# Patient Record
Sex: MALE | Race: WHITE | Employment: OTHER | URBAN - NONMETROPOLITAN AREA
[De-identification: names, ages, dates, MRNs, and addresses within clinical notes are randomized per-mention and may not be internally consistent; named-entity substitution may affect disease eponyms.]

---

## 2020-09-12 ENCOUNTER — APPOINTMENT (OUTPATIENT)
Dept: GENERAL RADIOLOGY | Age: 55
DRG: 466 | End: 2020-09-12
Attending: EMERGENCY MEDICINE
Payer: MEDICAID

## 2020-09-12 ENCOUNTER — APPOINTMENT (OUTPATIENT)
Dept: CT IMAGING | Age: 55
DRG: 466 | End: 2020-09-12
Attending: EMERGENCY MEDICINE
Payer: MEDICAID

## 2020-09-12 ENCOUNTER — HOSPITAL ENCOUNTER (INPATIENT)
Age: 55
LOS: 6 days | Discharge: NURSING, REHAB OR LONG-TERM CARE | DRG: 466 | End: 2020-09-18
Attending: EMERGENCY MEDICINE | Admitting: INTERNAL MEDICINE
Payer: MEDICAID

## 2020-09-12 DIAGNOSIS — N39.0 URINARY TRACT INFECTION ASSOCIATED WITH INDWELLING URETHRAL CATHETER, SUBSEQUENT ENCOUNTER: Primary | ICD-10-CM

## 2020-09-12 DIAGNOSIS — T83.511D URINARY TRACT INFECTION ASSOCIATED WITH INDWELLING URETHRAL CATHETER, SUBSEQUENT ENCOUNTER: Primary | ICD-10-CM

## 2020-09-12 PROBLEM — G06.1 SPINAL CORD ABSCESS: Status: ACTIVE | Noted: 2020-04-08

## 2020-09-12 PROBLEM — A41.9 SEPSIS (HCC): Status: ACTIVE | Noted: 2020-09-12

## 2020-09-12 PROBLEM — Z72.0 TOBACCO ABUSE: Status: ACTIVE | Noted: 2020-03-17

## 2020-09-12 PROBLEM — F10.20 ALCOHOLISM (HCC): Status: ACTIVE | Noted: 2020-03-17

## 2020-09-12 PROBLEM — F41.9 ANXIETY: Status: ACTIVE | Noted: 2020-07-20

## 2020-09-12 LAB
ANION GAP SERPL CALC-SCNC: 7 MMOL/L (ref 5–15)
APPEARANCE UR: ABNORMAL
APTT PPP: 31.9 SEC (ref 25–37.1)
BACTERIA URNS QL MICRO: ABNORMAL /HPF
BILIRUB UR QL: NEGATIVE
BUN SERPL-MCNC: 30 MG/DL (ref 6–20)
BUN/CREAT SERPL: 27 (ref 12–20)
CA-I BLD-MCNC: 10.8 MG/DL (ref 8.5–10.1)
CHLORIDE SERPL-SCNC: 97 MMOL/L (ref 97–108)
CO2 SERPL-SCNC: 32 MMOL/L (ref 21–32)
COLOR UR: ABNORMAL
CREAT SERPL-MCNC: 1.1 MG/DL (ref 0.7–1.3)
GLUCOSE SERPL-MCNC: 126 MG/DL (ref 65–100)
GLUCOSE UR STRIP.AUTO-MCNC: NEGATIVE MG/DL
HGB UR QL STRIP: ABNORMAL
KETONES UR QL STRIP.AUTO: NEGATIVE MG/DL
LEUKOCYTE ESTERASE UR QL STRIP.AUTO: ABNORMAL
NITRITE UR QL STRIP.AUTO: NEGATIVE
PH UR STRIP: 6 [PH] (ref 5–8)
POTASSIUM SERPL-SCNC: 3.5 MMOL/L (ref 3.5–5.1)
PROT UR STRIP-MCNC: 30 MG/DL
RBC #/AREA URNS HPF: ABNORMAL /HPF (ref 0–3)
SODIUM SERPL-SCNC: 136 MMOL/L (ref 136–145)
SP GR UR REFRACTOMETRY: 1.02 (ref 1–1.03)
THERAPEUTIC RANGE,PTTT: NORMAL SEC (ref 68–109)
UROBILINOGEN UR QL STRIP.AUTO: 0.2 EU/DL (ref 0.2–1)
WBC URNS QL MICRO: >100 /HPF (ref 0–5)

## 2020-09-12 PROCEDURE — 74176 CT ABD & PELVIS W/O CONTRAST: CPT

## 2020-09-12 PROCEDURE — 80048 BASIC METABOLIC PNL TOTAL CA: CPT

## 2020-09-12 PROCEDURE — 74011250637 HC RX REV CODE- 250/637: Performed by: EMERGENCY MEDICINE

## 2020-09-12 PROCEDURE — 74011250636 HC RX REV CODE- 250/636: Performed by: EMERGENCY MEDICINE

## 2020-09-12 PROCEDURE — 99285 EMERGENCY DEPT VISIT HI MDM: CPT

## 2020-09-12 PROCEDURE — 87077 CULTURE AEROBIC IDENTIFY: CPT

## 2020-09-12 PROCEDURE — 85730 THROMBOPLASTIN TIME PARTIAL: CPT

## 2020-09-12 PROCEDURE — 85025 COMPLETE CBC W/AUTO DIFF WBC: CPT

## 2020-09-12 PROCEDURE — 87086 URINE CULTURE/COLONY COUNT: CPT

## 2020-09-12 PROCEDURE — 65660000001 HC RM ICU INTERMED STEPDOWN

## 2020-09-12 PROCEDURE — 65270000029 HC RM PRIVATE

## 2020-09-12 PROCEDURE — 81001 URINALYSIS AUTO W/SCOPE: CPT

## 2020-09-12 PROCEDURE — 87040 BLOOD CULTURE FOR BACTERIA: CPT

## 2020-09-12 PROCEDURE — 71045 X-RAY EXAM CHEST 1 VIEW: CPT

## 2020-09-12 PROCEDURE — 87186 SC STD MICRODIL/AGAR DIL: CPT

## 2020-09-12 RX ORDER — GUAIFENESIN 100 MG/5ML
81 LIQUID (ML) ORAL DAILY
COMMUNITY
Start: 2020-07-28

## 2020-09-12 RX ORDER — LEVOFLOXACIN 5 MG/ML
750 INJECTION, SOLUTION INTRAVENOUS EVERY 24 HOURS
Status: DISCONTINUED | OUTPATIENT
Start: 2020-09-12 | End: 2020-09-18 | Stop reason: HOSPADM

## 2020-09-12 RX ORDER — GABAPENTIN 300 MG/1
300 CAPSULE ORAL 3 TIMES DAILY
COMMUNITY
Start: 2020-07-23

## 2020-09-12 RX ORDER — SODIUM CHLORIDE 9 MG/ML
150 INJECTION, SOLUTION INTRAVENOUS CONTINUOUS
Status: DISCONTINUED | OUTPATIENT
Start: 2020-09-12 | End: 2020-09-18 | Stop reason: HOSPADM

## 2020-09-12 RX ORDER — CYCLOBENZAPRINE HCL 5 MG
5 TABLET ORAL
COMMUNITY
Start: 2020-07-23

## 2020-09-12 RX ORDER — IPRATROPIUM BROMIDE AND ALBUTEROL SULFATE 2.5; .5 MG/3ML; MG/3ML
3 SOLUTION RESPIRATORY (INHALATION)
COMMUNITY
Start: 2020-07-23

## 2020-09-12 RX ORDER — FLUTICASONE PROPIONATE 50 MCG
1 SPRAY, SUSPENSION (ML) NASAL 2 TIMES DAILY
COMMUNITY
Start: 2020-07-23 | End: 2020-09-12

## 2020-09-12 RX ORDER — FAMOTIDINE 20 MG/1
20 TABLET, FILM COATED ORAL 2 TIMES DAILY
COMMUNITY
Start: 2020-07-28

## 2020-09-12 RX ORDER — ACETAMINOPHEN 500 MG
1000 TABLET ORAL
Status: COMPLETED | OUTPATIENT
Start: 2020-09-12 | End: 2020-09-12

## 2020-09-12 RX ORDER — ENOXAPARIN SODIUM 100 MG/ML
40 INJECTION SUBCUTANEOUS
COMMUNITY
Start: 2020-07-23 | End: 2021-04-25

## 2020-09-12 RX ORDER — TAMSULOSIN HYDROCHLORIDE 0.4 MG/1
0.4 CAPSULE ORAL
COMMUNITY
Start: 2020-07-23

## 2020-09-12 RX ORDER — ENOXAPARIN SODIUM 100 MG/ML
40 INJECTION SUBCUTANEOUS EVERY 24 HOURS
Status: CANCELLED | OUTPATIENT
Start: 2020-09-12

## 2020-09-12 RX ORDER — LANOLIN ALCOHOL/MO/W.PET/CERES
200 CREAM (GRAM) TOPICAL DAILY
COMMUNITY
Start: 2020-07-28 | End: 2021-04-25

## 2020-09-12 RX ORDER — CLONAZEPAM 0.5 MG/1
0.25 TABLET ORAL 2 TIMES DAILY
COMMUNITY
Start: 2020-07-23

## 2020-09-12 RX ORDER — ACETAMINOPHEN 325 MG/1
650 TABLET ORAL
COMMUNITY
Start: 2020-07-23

## 2020-09-12 RX ORDER — MELATONIN
500 DAILY
COMMUNITY
Start: 2020-07-24

## 2020-09-12 RX ADMIN — ACETAMINOPHEN 1000 MG: 500 TABLET ORAL at 22:13

## 2020-09-12 RX ADMIN — SODIUM CHLORIDE 150 ML/HR: 9 INJECTION, SOLUTION INTRAVENOUS at 21:37

## 2020-09-13 LAB
ANION GAP SERPL CALC-SCNC: 5 MMOL/L (ref 5–15)
BASOPHILS # BLD: 0 K/UL (ref 0–0.2)
BASOPHILS # BLD: 0 K/UL (ref 0–0.2)
BASOPHILS NFR BLD: 0 % (ref 0–2.5)
BASOPHILS NFR BLD: 0 % (ref 0–2.5)
BUN SERPL-MCNC: 24 MG/DL (ref 6–20)
BUN/CREAT SERPL: 26 (ref 12–20)
CA-I BLD-MCNC: 10.1 MG/DL (ref 8.5–10.1)
CHLORIDE SERPL-SCNC: 100 MMOL/L (ref 97–108)
CO2 SERPL-SCNC: 31 MMOL/L (ref 21–32)
CREAT SERPL-MCNC: 0.92 MG/DL (ref 0.7–1.3)
EOSINOPHIL # BLD: 0.1 K/UL (ref 0–0.7)
EOSINOPHIL # BLD: 0.1 K/UL (ref 0–0.7)
EOSINOPHIL NFR BLD: 0 % (ref 0.9–2.9)
EOSINOPHIL NFR BLD: 0 % (ref 0.9–2.9)
ERYTHROCYTE [DISTWIDTH] IN BLOOD BY AUTOMATED COUNT: 13.7 % (ref 11.5–14.5)
ERYTHROCYTE [DISTWIDTH] IN BLOOD BY AUTOMATED COUNT: 13.9 % (ref 11.5–14.5)
GLUCOSE SERPL-MCNC: 131 MG/DL (ref 65–100)
HCT VFR BLD AUTO: 32.7 % (ref 41–53)
HCT VFR BLD AUTO: 35.9 % (ref 41–53)
HGB BLD-MCNC: 11.2 % (ref 13.5–17.5)
HGB BLD-MCNC: 12.2 % (ref 13.5–17.5)
LACTATE SERPL-SCNC: 0.7 MMOL/L (ref 0.4–2)
LYMPHOCYTES # BLD: 1 K/UL (ref 1–4.8)
LYMPHOCYTES # BLD: 1 K/UL (ref 1–4.8)
LYMPHOCYTES NFR BLD: 5 % (ref 20.5–51.1)
LYMPHOCYTES NFR BLD: 5 % (ref 20.5–51.1)
MAGNESIUM SERPL-MCNC: 1.2 MG/DL (ref 1.6–2.4)
MCH RBC QN AUTO: 28.5 PG (ref 31–34)
MCH RBC QN AUTO: 28.8 PG (ref 31–34)
MCHC RBC AUTO-ENTMCNC: 33.9 G/DL (ref 31–36)
MCHC RBC AUTO-ENTMCNC: 34.2 G/DL (ref 31–36)
MCV RBC AUTO: 84.1 FL (ref 80–100)
MCV RBC AUTO: 84.2 FL (ref 80–100)
MONOCYTES # BLD: 2.1 K/UL (ref 0.2–2.4)
MONOCYTES # BLD: 2.6 K/UL (ref 0.2–2.4)
MONOCYTES NFR BLD: 11 % (ref 1.7–9.3)
MONOCYTES NFR BLD: 13 % (ref 1.7–9.3)
NEUTS SEG # BLD: 15.5 K/UL (ref 1.8–7.7)
NEUTS SEG # BLD: 16.5 K/UL (ref 1.8–7.7)
NEUTS SEG NFR BLD: 82 % (ref 42–75)
NEUTS SEG NFR BLD: 84 % (ref 42–75)
NRBC # BLD: 0.04 K/UL
NRBC # BLD: 0.06 K/UL
NRBC BLD-RTO: 20 PER 100 WBC
NRBC BLD-RTO: 30 PER 100 WBC
PLATELET # BLD AUTO: 339 K/UL
PLATELET # BLD AUTO: 366 K/UL
PMV BLD AUTO: 7.4 FL (ref 6.5–11.5)
PMV BLD AUTO: 7.5 FL (ref 6.5–11.5)
POTASSIUM SERPL-SCNC: 3.7 MMOL/L (ref 3.5–5.1)
RBC # BLD AUTO: 3.88 M/UL (ref 4.5–5.9)
RBC # BLD AUTO: 4.26 M/UL (ref 4.5–5.9)
SODIUM SERPL-SCNC: 136 MMOL/L (ref 136–145)
WBC # BLD AUTO: 18.7 K/UL (ref 4.4–11.3)
WBC # BLD AUTO: 20.2 K/UL (ref 4.4–11.3)

## 2020-09-13 PROCEDURE — 74011250637 HC RX REV CODE- 250/637: Performed by: PHYSICIAN ASSISTANT

## 2020-09-13 PROCEDURE — 74011250636 HC RX REV CODE- 250/636: Performed by: EMERGENCY MEDICINE

## 2020-09-13 PROCEDURE — 74011250637 HC RX REV CODE- 250/637: Performed by: INTERNAL MEDICINE

## 2020-09-13 PROCEDURE — 65660000001 HC RM ICU INTERMED STEPDOWN

## 2020-09-13 PROCEDURE — 85025 COMPLETE CBC W/AUTO DIFF WBC: CPT

## 2020-09-13 PROCEDURE — 83605 ASSAY OF LACTIC ACID: CPT

## 2020-09-13 PROCEDURE — 83735 ASSAY OF MAGNESIUM: CPT

## 2020-09-13 PROCEDURE — 80048 BASIC METABOLIC PNL TOTAL CA: CPT

## 2020-09-13 PROCEDURE — 74011000258 HC RX REV CODE- 258: Performed by: INTERNAL MEDICINE

## 2020-09-13 PROCEDURE — 65270000029 HC RM PRIVATE

## 2020-09-13 PROCEDURE — 94760 N-INVAS EAR/PLS OXIMETRY 1: CPT

## 2020-09-13 PROCEDURE — 36415 COLL VENOUS BLD VENIPUNCTURE: CPT

## 2020-09-13 PROCEDURE — 74011250636 HC RX REV CODE- 250/636: Performed by: PHYSICIAN ASSISTANT

## 2020-09-13 PROCEDURE — 74011000258 HC RX REV CODE- 258: Performed by: EMERGENCY MEDICINE

## 2020-09-13 PROCEDURE — 74011250636 HC RX REV CODE- 250/636: Performed by: INTERNAL MEDICINE

## 2020-09-13 RX ORDER — CLONAZEPAM 1 MG/1
0.25 TABLET ORAL EVERY 12 HOURS
Status: DISCONTINUED | OUTPATIENT
Start: 2020-09-13 | End: 2020-09-14

## 2020-09-13 RX ORDER — MELATONIN
500 DAILY
Status: DISCONTINUED | OUTPATIENT
Start: 2020-09-13 | End: 2020-09-18 | Stop reason: HOSPADM

## 2020-09-13 RX ORDER — IBUPROFEN 600 MG/1
600 TABLET ORAL
Status: DISPENSED | OUTPATIENT
Start: 2020-09-13 | End: 2020-09-16

## 2020-09-13 RX ORDER — POLYETHYLENE GLYCOL 3350 17 G/17G
17 POWDER, FOR SOLUTION ORAL DAILY PRN
Status: DISCONTINUED | OUTPATIENT
Start: 2020-09-13 | End: 2020-09-18 | Stop reason: HOSPADM

## 2020-09-13 RX ORDER — IPRATROPIUM BROMIDE AND ALBUTEROL SULFATE 2.5; .5 MG/3ML; MG/3ML
3 SOLUTION RESPIRATORY (INHALATION)
Status: DISCONTINUED | OUTPATIENT
Start: 2020-09-13 | End: 2020-09-15

## 2020-09-13 RX ORDER — GABAPENTIN 300 MG/1
300 CAPSULE ORAL EVERY 8 HOURS
Status: DISCONTINUED | OUTPATIENT
Start: 2020-09-13 | End: 2020-09-18 | Stop reason: HOSPADM

## 2020-09-13 RX ORDER — TAMSULOSIN HYDROCHLORIDE 0.4 MG/1
0.4 CAPSULE ORAL DAILY
Status: DISCONTINUED | OUTPATIENT
Start: 2020-09-13 | End: 2020-09-18 | Stop reason: HOSPADM

## 2020-09-13 RX ORDER — SODIUM CHLORIDE 0.9 % (FLUSH) 0.9 %
5-40 SYRINGE (ML) INJECTION EVERY 8 HOURS
Status: DISCONTINUED | OUTPATIENT
Start: 2020-09-13 | End: 2020-09-18 | Stop reason: HOSPADM

## 2020-09-13 RX ORDER — CYCLOBENZAPRINE HCL 10 MG
5 TABLET ORAL
Status: DISCONTINUED | OUTPATIENT
Start: 2020-09-13 | End: 2020-09-18 | Stop reason: HOSPADM

## 2020-09-13 RX ORDER — ENOXAPARIN SODIUM 100 MG/ML
40 INJECTION SUBCUTANEOUS DAILY
Status: DISCONTINUED | OUTPATIENT
Start: 2020-09-13 | End: 2020-09-18 | Stop reason: HOSPADM

## 2020-09-13 RX ORDER — FAMOTIDINE 20 MG/1
20 TABLET, FILM COATED ORAL EVERY 12 HOURS
Status: DISCONTINUED | OUTPATIENT
Start: 2020-09-13 | End: 2020-09-18 | Stop reason: HOSPADM

## 2020-09-13 RX ORDER — ACETAMINOPHEN 325 MG/1
650 TABLET ORAL
Status: DISCONTINUED | OUTPATIENT
Start: 2020-09-13 | End: 2020-09-13

## 2020-09-13 RX ORDER — BISMUTH SUBSALICYLATE 262 MG
1 TABLET,CHEWABLE ORAL DAILY
Status: DISCONTINUED | OUTPATIENT
Start: 2020-09-13 | End: 2020-09-18 | Stop reason: HOSPADM

## 2020-09-13 RX ORDER — ASPIRIN 325 MG/1
100 TABLET, FILM COATED ORAL DAILY
Status: DISCONTINUED | OUTPATIENT
Start: 2020-09-13 | End: 2020-09-18 | Stop reason: HOSPADM

## 2020-09-13 RX ORDER — PROMETHAZINE HYDROCHLORIDE 25 MG/1
12.5 TABLET ORAL
Status: DISCONTINUED | OUTPATIENT
Start: 2020-09-13 | End: 2020-09-18 | Stop reason: HOSPADM

## 2020-09-13 RX ORDER — OXYCODONE AND ACETAMINOPHEN 5; 325 MG/1; MG/1
1 TABLET ORAL
Status: DISCONTINUED | OUTPATIENT
Start: 2020-09-13 | End: 2020-09-15

## 2020-09-13 RX ORDER — ONDANSETRON 2 MG/ML
4 INJECTION INTRAMUSCULAR; INTRAVENOUS
Status: DISCONTINUED | OUTPATIENT
Start: 2020-09-13 | End: 2020-09-18 | Stop reason: HOSPADM

## 2020-09-13 RX ORDER — SODIUM CHLORIDE 0.9 % (FLUSH) 0.9 %
5-40 SYRINGE (ML) INJECTION AS NEEDED
Status: DISCONTINUED | OUTPATIENT
Start: 2020-09-13 | End: 2020-09-18 | Stop reason: HOSPADM

## 2020-09-13 RX ORDER — GUAIFENESIN 100 MG/5ML
81 LIQUID (ML) ORAL DAILY
Status: DISCONTINUED | OUTPATIENT
Start: 2020-09-13 | End: 2020-09-18 | Stop reason: HOSPADM

## 2020-09-13 RX ADMIN — CEFEPIME 2 G: 2 INJECTION, POWDER, FOR SOLUTION INTRAVENOUS at 17:27

## 2020-09-13 RX ADMIN — CEFEPIME 2 G: 2 INJECTION, POWDER, FOR SOLUTION INTRAVENOUS at 01:15

## 2020-09-13 RX ADMIN — OXYCODONE HYDROCHLORIDE AND ACETAMINOPHEN 1 TABLET: 5; 325 TABLET ORAL at 10:20

## 2020-09-13 RX ADMIN — SODIUM CHLORIDE 150 ML/HR: 9 INJECTION, SOLUTION INTRAVENOUS at 04:40

## 2020-09-13 RX ADMIN — SODIUM CHLORIDE 150 ML/HR: 9 INJECTION, SOLUTION INTRAVENOUS at 10:40

## 2020-09-13 RX ADMIN — TAMSULOSIN HYDROCHLORIDE 0.4 MG: 0.4 CAPSULE ORAL at 10:18

## 2020-09-13 RX ADMIN — Medication 10 ML: at 10:27

## 2020-09-13 RX ADMIN — CLONAZEPAM 0.25 MG: 1 TABLET ORAL at 10:26

## 2020-09-13 RX ADMIN — GABAPENTIN 300 MG: 300 CAPSULE ORAL at 10:18

## 2020-09-13 RX ADMIN — LEVOFLOXACIN 750 MG: 5 INJECTION, SOLUTION INTRAVENOUS at 22:39

## 2020-09-13 RX ADMIN — CLONAZEPAM 0.25 MG: 1 TABLET ORAL at 20:44

## 2020-09-13 RX ADMIN — GABAPENTIN 300 MG: 300 CAPSULE ORAL at 14:00

## 2020-09-13 RX ADMIN — IBUPROFEN 600 MG: 600 TABLET, FILM COATED ORAL at 10:16

## 2020-09-13 RX ADMIN — SODIUM CHLORIDE 150 ML/HR: 9 INJECTION, SOLUTION INTRAVENOUS at 17:26

## 2020-09-13 RX ADMIN — FAMOTIDINE 20 MG: 20 TABLET, FILM COATED ORAL at 20:44

## 2020-09-13 RX ADMIN — Medication 10 ML: at 14:00

## 2020-09-13 RX ADMIN — LEVOFLOXACIN 750 MG: 5 INJECTION, SOLUTION INTRAVENOUS at 01:14

## 2020-09-13 RX ADMIN — FAMOTIDINE 20 MG: 20 TABLET, FILM COATED ORAL at 10:17

## 2020-09-13 RX ADMIN — VITAMIN D, TAB 1000IU (100/BT) 0.5 TABLET: 25 TAB at 10:20

## 2020-09-13 RX ADMIN — MULTIVITAMIN TABLET 1 TABLET: TABLET at 10:17

## 2020-09-13 RX ADMIN — GABAPENTIN 300 MG: 300 CAPSULE ORAL at 22:39

## 2020-09-13 RX ADMIN — CEFEPIME 2 G: 2 INJECTION, POWDER, FOR SOLUTION INTRAVENOUS at 10:18

## 2020-09-13 RX ADMIN — ENOXAPARIN SODIUM 40 MG: 40 INJECTION SUBCUTANEOUS at 10:19

## 2020-09-13 RX ADMIN — THIAMINE HCL TAB 100 MG 100 MG: 100 TAB at 10:17

## 2020-09-13 RX ADMIN — ASPIRIN 81 MG 81 MG: 81 TABLET ORAL at 10:18

## 2020-09-13 NOTE — ROUTINE PROCESS
Bedside shift change report given to [address] (oncoming nurse) by Dee Dahl nurse).  Report included the following information SBAR

## 2020-09-13 NOTE — ED PROVIDER NOTES
HPI   Patient is a 48 y.o.  male [facility] who presents to the ER with a chief complain   Chief Complaint   Patient presents with    Fever    Generalized Body Aches        Past Medical History:   Diagnosis Date    History of spinal surgery     Urinary tract infection     frequent uti       History reviewed. No pertinent surgical history. History reviewed. No pertinent family history. Social History     Socioeconomic History    Marital status: SINGLE     Spouse name: Not on file    Number of children: Not on file    Years of education: Not on file    Highest education level: Not on file   Occupational History    Not on file   Social Needs    Financial resource strain: Not on file    Food insecurity     Worry: Not on file     Inability: Not on file    Transportation needs     Medical: Not on file     Non-medical: Not on file   Tobacco Use    Smoking status: Current Every Day Smoker     Types: Cigarettes   Substance and Sexual Activity    Alcohol use: Not on file    Drug use: Not on file    Sexual activity: Not on file   Lifestyle    Physical activity     Days per week: 0 days     Minutes per session: 0 min    Stress: Very much   Relationships    Social connections     Talks on phone: Not on file     Gets together: Not on file     Attends [employer] service: Not on file     Active member of club or organization: Not on file     Attends meetings of clubs or organizations: Not on file     Relationship status: Not on file    Intimate partner violence     Fear of current or ex partner: Not on file     Emotionally abused: Not on file     Physically abused: Not on file     Forced sexual activity: Not on file   Other Topics Concern    Not on file   Social History Narrative    Not on file         ALLERGIES: Patient has no known allergies. Review of Systems   Constitutional: Negative. HENT: Negative. Eyes: Negative. Respiratory: Negative. Cardiovascular: Negative. Gastrointestinal: Negative. Endocrine: Negative. Genitourinary: Negative. Musculoskeletal: Negative. Allergic/Immunologic: Negative. Neurological: Negative. Hematological: Negative. Psychiatric/Behavioral: Negative. Vitals:    09/12/20 1520   BP: 95/65   Pulse: 100   Resp: 18   Temp: 99 °F (37.2 °C)   SpO2: 94%   Weight: 56.2 kg (124 lb)   Height: 6' (1.829 m)            Physical Exam  Vitals signs and nursing note reviewed. Constitutional:       Appearance: Normal appearance. He is normal weight. HENT:      Head: Normocephalic. Right Ear: Tympanic membrane normal.      Left Ear: Tympanic membrane normal.      Nose: Nose normal.      Mouth/Throat:      Mouth: Mucous membranes are moist.   Eyes:      Extraocular Movements: Extraocular movements intact. Pupils: Pupils are equal, round, and reactive to light. Neck:      Musculoskeletal: Normal range of motion and neck supple. Cardiovascular:      Rate and Rhythm: Normal rate and regular rhythm. Pulses: Normal pulses. Heart sounds: Normal heart sounds. Pulmonary:      Effort: Pulmonary effort is normal.      Breath sounds: Normal breath sounds. Abdominal:      General: Abdomen is flat. Bowel sounds are normal.      Palpations: Abdomen is soft. Genitourinary:     Comments: Bladder Urinary Bladder Outlet syndrome  Musculoskeletal: Normal range of motion. Neurological:      Mental Status: He is alert and oriented to person, place, and time. Sensory: Sensory deficit present. Motor: Weakness present.       Comments: Bilateral Lower extremity paralysis          MDM  Number of Diagnoses or Management Options     Amount and/or Complexity of Data Reviewed  Clinical lab tests: reviewed      ED Course as of Sep 12 2120   Sat Sep 12, 2020   1943 URINALYSIS W/ RFLX MICROSCOPIC(!):    Color Yellow/Straw   Appearance Cloudy(!)   Specific gravity 1.025   pH (UA) 6.0   Protein 30(!)   Glucose Negative Ketone Negative   Bilirubin Negative   Blood Small(!)   Urobilinogen 0.2   Nitrites Negative   Leukocyte Esterase Moderate(!) [MM]   1943 URINE MICROSCOPIC(!):    WBC >100(!)   RBC 0-5   Bacteria 4+(!) [MM]   1944 URINE MICROSCOPIC(!):    WBC >100(!)   RBC 0-5   Bacteria 4+(!) [MM]      ED Course User Index  [MM] Estella Holter, MD       Procedures

## 2020-09-13 NOTE — ASSESSMENT & PLAN NOTE
Pt has had temp up to 103 and is being treated with ibuprofen.   Continue antibiotics continue antipyretics

## 2020-09-13 NOTE — ROUTINE PROCESS
TRANSFER - IN REPORT:    Verbal report received from Dewey Martinez (name) on Carlos Mosley  being received from [facility], LPN(unit) for routine progression of care      Report consisted of patients Situation, Background, Assessment and   Recommendations(SBAR). Information from the following report(s) SBAR was reviewed with the receiving nurse. Opportunity for questions and clarification was provided. Assessment completed upon patients arrival to unit and care assumed.

## 2020-09-13 NOTE — ASSESSMENT & PLAN NOTE
-Leukocytosis with likely primary source from Ferrell catheter  -No fever but otherwise meets criteria for sepsis  -IV fluids 0.9% normal saline 125 an hour  -Initiate Levaquin 750 mg IV piggyback daily  -Urine and blood cultures pending

## 2020-09-13 NOTE — H&P
History and Physical    Subjective:     Carlos Mosley is a 48 y.o. male  has a past medical history of History of spinal surgery tobacco abuse, spinal cord abscess, alcoholism, anxiety, IV drug abuse and paraplegia presents emergency department with diffuse pain to left hip arms and left side along midaxillary line. Patient reports that he has been feeling just poorly in the preceding 2 weeks prior to his arrival in the emergency department. He does have a indwelling Ferrell catheter as this is required secondary to the paraplegia. 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 He is presently a resident in a rehab/skilled nursing facility and had been making progress where he was able to feed himself until about 2 weeks ago where he felt weak and reduced arm strength. The patient was evaluated in the emergency department and found to have cloudy urine with small blood and moderate leukoesterase and negative nitrites and +4 bacteria    Normal sodium and potassium however patient does have a mildly elevated glucose with elevated BUN while creatinine is normal.  Calcium is also mildly elevated which we will follow closely. Portable chest shows no acute findings. As noted leukocytosis present with WBCs at 20.2 decreased H&H at 12.2/35.9, respectively. Platelets are normal at 366    Due to prior history of spinal abscess abdominal pelvic CT without contrast has been ordered pending results. The patient will be admitted to the acute care service expect length of stay of at least 2-3 midnight on telemetry. Past Medical History:   Diagnosis Date    History of spinal surgery     Urinary tract infection     frequent uti      Past Surgical History:   Procedure Laterality Date    HX ORTHOPAEDIC       History reviewed. No pertinent family history.    Social History     Tobacco Use    Smoking status: Current Every Day Smoker     Types: Cigarettes Substance Use Topics    Alcohol use: Not on file       Prior to Admission medications    Medication Sig Start Date End Date Taking? Authorizing Provider   acetaminophen (TYLENOL) 325 mg tablet Take 650 mg by mouth every four (4) hours as needed. 7/23/20  Yes Provider, Historical   aspirin 81 mg chewable tablet Take 81 mg by mouth daily. 7/28/20  Yes Provider, Historical   cholecalciferol (VITAMIN D3) (1000 Units /25 mcg) tablet Take 500 Units by mouth daily. 7/24/20  Yes Provider, Historical   clonazePAM (KlonoPIN) 0.5 mg tablet Take 0.25 mg by mouth two (2) times a day. 7/23/20  Yes Provider, Historical   cyclobenzaprine (FLEXERIL) 5 mg tablet Take 5 mg by mouth every eight (8) hours as needed. 7/23/20  Yes Provider, Historical   enoxaparin (LOVENOX) 40 mg/0.4 mL 40 mg by SubCUTAneous route. 7/23/20  Yes Provider, Historical   famotidine (PEPCID) 20 mg tablet Take 20 mg by mouth two (2) times a day. 7/28/20  Yes Provider, Historical   gabapentin (NEURONTIN) 300 mg capsule Take 300 mg by mouth three (3) times daily. 7/23/20  Yes Provider, Historical   albuterol-ipratropium (DUO-NEB) 2.5 mg-0.5 mg/3 ml nebu Take 3 mL by inhalation every six (6) hours as needed. 7/23/20  Yes Provider, Historical   thiamine HCL (B-1) 100 mg tablet Take 200 mg by mouth daily. 7/28/20  Yes Provider, Historical   tamsulosin (FLOMAX) 0.4 mg capsule Take 0.4 mg by mouth. 7/23/20  Yes Provider, Historical   multivit-min-iron-folic-vit K1 8 TS-024 mcg- 10 mcg chew Take 1 Tab by mouth daily. 7/28/20  Yes Provider, Historical     No Known Allergies     Review of Systems:  Review of Systems   Constitutional: Positive for activity change, appetite change and fatigue. Negative for chills, diaphoresis, fever and unexpected weight change. HENT: Negative for congestion, dental problem, ear discharge, ear pain, mouth sores, nosebleeds, sore throat and tinnitus. Eyes: Negative.     Respiratory: Negative for apnea, cough, choking, chest tightness, shortness of breath and wheezing. Gastrointestinal: Positive for constipation. Negative for abdominal distention, abdominal pain, anal bleeding, diarrhea and vomiting. Endocrine: Negative. Genitourinary: Positive for difficulty urinating, dysuria, flank pain and hematuria. Negative for enuresis. Musculoskeletal: Positive for back pain and myalgias. Skin: Negative. Allergic/Immunologic: Negative. Neurological: Positive for weakness. Negative for dizziness, tremors, seizures, syncope, facial asymmetry and light-headedness. Hematological: Negative for adenopathy. Does not bruise/bleed easily. Psychiatric/Behavioral: Positive for agitation and confusion. The patient is nervous/anxious. All other systems reviewed and are negative. Objective:     Vitals:  Visit Vitals  BP 95/65 (BP Patient Position: At rest)   Pulse 100   Temp 99 °F (37.2 °C)   Resp 18   Ht 6' (1.829 m)   Wt 56.2 kg (124 lb)   SpO2 94%   BMI 16.82 kg/m²       Physical Exam:  General: Alert, cooperative, no distress. Head:  Normocephalic, without obvious abnormality, atraumatic. Eyes:  Conjunctivae/corneas clear. Pupils equal, round, reactive to light. Extraocular movements intact. Lungs:  Clear to auscultation bilaterally, no wheezes, crackles  Chest wall: No tenderness or deformity. Heart:  Regular rate and rhythm, S1, S2 normal, no murmur, click, rub, or gallop. Abdomen:   Soft, non-tender. Bowel sounds normal. No masses. No organomegaly. Back:  No spine tenderness to palpation  Extremities: Extremities normal, atraumatic, no cyanosis or edema. Pulses: Symmetric all extremities.   Skin: Skin color, texture, turgor normal.   Lymph nodes: Cervical nodes normal.  Neurologic: Awake and oriented,       Labs:  Recent Results (from the past 24 hour(s))   URINALYSIS W/ RFLX MICROSCOPIC    Collection Time: 09/12/20  4:45 PM   Result Value Ref Range    Color Yellow/Straw      Appearance Cloudy (A) Clear Specific gravity 1.025 1.003 - 1.030      pH (UA) 6.0 5.0 - 8.0      Protein 30 (A) Negative mg/dL    Glucose Negative Negative mg/dL    Ketone Negative Negative mg/dL    Bilirubin Negative Negative      Blood Small (A) Negative      Urobilinogen 0.2 0.2 - 1.0 EU/dL    Nitrites Negative Negative      Leukocyte Esterase Moderate (A) Negative     CBC WITH AUTOMATED DIFF    Collection Time: 09/12/20  4:45 PM   Result Value Ref Range    WBC 20.2 (H) 4.4 - 11.3 K/uL    RBC 4.26 (L) 4.50 - 5.90 M/uL    HGB 12.2 (L) 13.5 - 17.5 %    HCT 35.9 (L) 41 - 53 %    MCV 84.2 80 - 100 FL    MCH 28.5 (L) 31 - 34 PG    MCHC 33.9 31.0 - 36.0 g/dL    RDW 13.9 11.5 - 14.5 %    PLATELET 505 K/uL    MPV 7.5 6.5 - 11.5 FL    NRBC 20.0  WBC    ABSOLUTE NRBC 0.04 K/uL    ABS. MONOCYTES PENDING K/UL    NEUTROPHILS 82 (H) 42 - 75 %    LYMPHOCYTES 5 (L) 20.5 - 51.1 %    MONOCYTES 13 (H) 1.7 - 9.3 %    EOSINOPHILS 0 (L) 0.9 - 2.9 %    BASOPHILS 0 0.0 - 2.5 %    ABS. NEUTROPHILS 16.5 (H) 1.8 - 7.7 K/UL    ABS. LYMPHOCYTES 1.0 1.0 - 4.8 K/UL    ABS. EOSINOPHILS 0.1 0.0 - 0.7 K/UL    ABS.  BASOPHILS 0.0 0.0 - 0.2 K/UL   METABOLIC PANEL, BASIC    Collection Time: 09/12/20  4:45 PM   Result Value Ref Range    Sodium 136 136 - 145 mmol/L    Potassium 3.5 3.5 - 5.1 mmol/L    Chloride 97 97 - 108 mmol/L    CO2 32 21 - 32 mmol/L    Anion gap 7 5 - 15 mmol/L    Glucose 126 (H) 65 - 100 mg/dL    BUN 30 (H) 6 - 20 mg/dL    Creatinine 1.10 0.70 - 1.30 mg/dL    BUN/Creatinine ratio 27 (H) 12 - 20      GFR est AA >60 >60 ml/min/1.73m2    GFR est non-AA >60 >60 ml/min/1.73m2    Calcium 10.8 (H) 8.5 - 10.1 mg/dL   PTT    Collection Time: 09/12/20  4:45 PM   Result Value Ref Range    aPTT 31.9 25.0 - 37.1 sec    aPTT, therapeutic range   68 - 109 sec   URINE MICROSCOPIC    Collection Time: 09/12/20  4:45 PM   Result Value Ref Range    WBC >100 (H) 0 - 5 /hpf    RBC 0-5 0 - 3 /hpf    Bacteria 4+ (A) Negative /hpf       Imaging:  Xr Chest Port    Result Date: 9/12/2020  Single frontal view of the chest. No prior films for comparison. Heart size is normal. Lungs are clear of infiltrate. No pulmonary nodule or pleural effusion is seen. There is minimal deformity of several left ribs consistent with old healed fractures. No acute bony abnormalities are identified. Impression: No acute abnormality demonstrated. Assessment & Plan:     Sepsis ([facility] [address]  -Leukocytosis with likely primary source from Ferrell catheter  -No fever but otherwise meets criteria for sepsis  -IV fluids 0.9% normal saline 125 an hour  -Initiate Levaquin 750 mg IV piggyback daily  -Urine and blood cultures pending    UTI (urinary tract infection)  -Leukocytosis with likely primary source from Ferrell catheter  -No fever but otherwise meets criteria for sepsis  -IV fluids 0.9% normal saline 125 an hour  -Initiate Levaquin 750 mg IV piggyback daily  -Urine and blood cultures pending  -Urine is cloudy, with small blood and moderate leukoesterase and negative nitrites and +4 bacteria    Normal sodium and potassium however patient does have a mildly elevated glucose with elevated BUN while creatinine is normal.  Calcium is also mildly elevated which we will follow closely. Portable chest shows no acute findings. As noted leukocytosis present with WBCs at 20.2 decreased H&H at 12.2/35.9, respectively.   Platelets are normal at 366      Anxiety  -Continue with clonazepam as needed  -Otherwise stable    Tobacco abuse  -Nicotine patch has been offered  -Patient has refused    Spinal cord abscess  -Suspicion about whether related to previous history of IV drug abuse  -Patient remains afebrile however leukocytosis suspect associated with UTI/urosepsis  -Abdomen pelvic CT obtained to evaluate possible spinal cord acute changes    Alcoholism ([facility] [address]  -CIWA less than 6  -Low risk for withdrawal     CODE Status: Full    VTE: Lovenox 40 mg subcu daily and SCDs    SIGNED:  Roland Paul, PhD, PA-C, [facility]

## 2020-09-13 NOTE — ASSESSMENT & PLAN NOTE
-Leukocytosis with likely primary source from Ferrell catheter  -No fever but otherwise meets criteria for sepsis  -IV fluids 0.9% normal saline 125 an hour  -Initiate Levaquin 750 mg IV piggyback daily  -Urine and blood cultures pending  -Urine is cloudy, with small blood and moderate leukoesterase and negative nitrites and +4 bacteria    Normal sodium and potassium however patient does have a mildly elevated glucose with elevated BUN while creatinine is normal.  Calcium is also mildly elevated which we will follow closely. Portable chest shows no acute findings. As noted leukocytosis present with WBCs at 20.2 decreased H&H at 12.2/35.9, respectively.   Platelets are normal at 366

## 2020-09-13 NOTE — PROGRESS NOTES
Progress Note  Date:9/13/2020       Room:201/01  Patient Maxwell Adam     YOB: 1965     Age:55 y.o. Subjective    Jana Murrieta is a 48 y.o. male  has a past medical history of History of spinal surgery tobacco abuse, spinal cord abscess, alcoholism, anxiety, IV drug abuse and paraplegia presents emergency department with diffuse pain to left hip arms and left side along midaxillary line. Patient reports that he has been feeling just poorly in the preceding 2 weeks prior to his arrival in the emergency department. He does have a indwelling Ferrell catheter as this is required secondary to the paraplegia. 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 He is presently a resident in a rehab/skilled nursing facility and had been making progress where he was able to feed himself until about 2 weeks ago where he felt weak and reduced arm strength. Patient is seen Gracesadie Go in bed this morning with complaints of generalized numbness and pain and is requesting the oxycodone that he normally takes twice a day at home. Review of Systems   Constitutional: Positive for chills. Neurological: Positive for weakness and numbness. Objective           Vitals Last 24 Hours:  Patient Vitals for the past 24 hrs:   Temp Pulse Resp BP SpO2   09/13/20 0800     94 %   09/13/20 0755 (!) 101.8 °F (38.8 °C) (!) 116 18 107/67 98 %   09/13/20 0456 97 °F (36.1 °C) 86 18 119/72 100 %   09/13/20 0040 100.3 °F (37.9 °C) 96 17 (!) 100/53 96 %   09/12/20 2200  94 18 129/72 91 %   09/12/20 2130  (!) 121 18 120/73 90 %   09/12/20 2100 (!) 103.2 °F (39.6 °C)       09/12/20 2030  83 16 (!) 154/87    09/12/20 1930  83  (!) 154/87    09/12/20 1520 99 °F (37.2 °C) 100 18 95/65 94 %        I/O (24Hr):     Intake/Output Summary (Last 24 hours) at 9/13/2020 0947  Last data filed at 9/13/2020 0731  Gross per 24 hour   Intake 1476 ml   Output 1000 ml   Net 476 ml       Physical Exam:  Gen:  AAO X 4, NAD  HEENT   Eyes: extraocular muscles intact  Heart:  RRR  Lungs: CTA, neg wheezes/crackles  Abd:  Soft, nontender, nondistended, positive BS  Ext: no cyanosis, clubbing or edema  Neuro: awake and alert, answering questions      Medications           Current Facility-Administered Medications   Medication Dose Route Frequency    albuterol-ipratropium (DUO-NEB) 2.5 MG-0.5 MG/3 ML  3 mL Nebulization Q6H PRN    aspirin chewable tablet 81 mg  81 mg Oral DAILY    cholecalciferol (VITAMIN D3) (1000 Units /25 mcg) tablet 0.5 Tab  500 Units Oral DAILY    clonazePAM (KlonoPIN) tablet 0.25 mg  0.25 mg Oral BID    cyclobenzaprine (FLEXERIL) tablet 5 mg  5 mg Oral TID PRN    famotidine (PEPCID) tablet 20 mg  20 mg Oral BID    gabapentin (NEURONTIN) capsule 300 mg  300 mg Oral TID    . PHARMACY TO SUBSTITUTE PER PROTOCOL (Reordered from: multivit-min-iron-folic-vit K1 8 LR-072 mcg- 10 mcg chew)    Per Protocol    tamsulosin (FLOMAX) capsule 0.4 mg  0.4 mg Oral DAILY    . PHARMACY TO SUBSTITUTE PER PROTOCOL (Reordered from: thiamine HCL (B-1) 100 mg tablet)    Per Protocol    sodium chloride (NS) flush 5-40 mL  5-40 mL IntraVENous Q8H    sodium chloride (NS) flush 5-40 mL  5-40 mL IntraVENous PRN    polyethylene glycol (MIRALAX) packet 17 g  17 g Oral DAILY PRN    promethazine (PHENERGAN) tablet 12.5 mg  12.5 mg Oral Q6H PRN    Or    ondansetron (ZOFRAN) injection 4 mg  4 mg IntraVENous Q6H PRN    enoxaparin (LOVENOX) injection 40 mg  40 mg SubCUTAneous DAILY    oxyCODONE-acetaminophen (PERCOCET) 5-325 mg per tablet 1 Tab  1 Tab Oral BID PRN    ibuprofen (MOTRIN) tablet 600 mg  600 mg Oral Q8H PRN    0.9% sodium chloride infusion  150 mL/hr IntraVENous CONTINUOUS    cefepime (MAXIPIME) 2 g in 0.9% sodium chloride (MBP/ADV) 100 mL MBP  2 g IntraVENous Q8H    levoFLOXacin (LEVAQUIN) 750 mg in D5W IVPB  750 mg IntraVENous Q24H         Allergies         Patient has no known allergies. Labs/Imaging/Diagnostics      Labs:  Recent Results (from the past 24 hour(s))   URINALYSIS W/ RFLX MICROSCOPIC    Collection Time: 09/12/20  4:45 PM   Result Value Ref Range    Color Yellow/Straw      Appearance Cloudy (A) Clear      Specific gravity 1.025 1.003 - 1.030      pH (UA) 6.0 5.0 - 8.0      Protein 30 (A) Negative mg/dL    Glucose Negative Negative mg/dL    Ketone Negative Negative mg/dL    Bilirubin Negative Negative      Blood Small (A) Negative      Urobilinogen 0.2 0.2 - 1.0 EU/dL    Nitrites Negative Negative      Leukocyte Esterase Moderate (A) Negative     CBC WITH AUTOMATED DIFF    Collection Time: 09/12/20  4:45 PM   Result Value Ref Range    WBC 20.2 (H) 4.4 - 11.3 K/uL    RBC 4.26 (L) 4.50 - 5.90 M/uL    HGB 12.2 (L) 13.5 - 17.5 %    HCT 35.9 (L) 41 - 53 %    MCV 84.2 80 - 100 FL    MCH 28.5 (L) 31 - 34 PG    MCHC 33.9 31.0 - 36.0 g/dL    RDW 13.9 11.5 - 14.5 %    PLATELET 670 K/uL    MPV 7.5 6.5 - 11.5 FL    NRBC 20.0  WBC    ABSOLUTE NRBC 0.04 K/uL    ABS. MONOCYTES 2.6 (H) 0.2 - 2.4 K/UL    NEUTROPHILS 82 (H) 42 - 75 %    LYMPHOCYTES 5 (L) 20.5 - 51.1 %    MONOCYTES 13 (H) 1.7 - 9.3 %    EOSINOPHILS 0 (L) 0.9 - 2.9 %    BASOPHILS 0 0.0 - 2.5 %    ABS. NEUTROPHILS 16.5 (H) 1.8 - 7.7 K/UL    ABS. LYMPHOCYTES 1.0 1.0 - 4.8 K/UL    ABS. EOSINOPHILS 0.1 0.0 - 0.7 K/UL    ABS.  BASOPHILS 0.0 0.0 - 0.2 K/UL   METABOLIC PANEL, BASIC    Collection Time: 09/12/20  4:45 PM   Result Value Ref Range    Sodium 136 136 - 145 mmol/L    Potassium 3.5 3.5 - 5.1 mmol/L    Chloride 97 97 - 108 mmol/L    CO2 32 21 - 32 mmol/L    Anion gap 7 5 - 15 mmol/L    Glucose 126 (H) 65 - 100 mg/dL    BUN 30 (H) 6 - 20 mg/dL    Creatinine 1.10 0.70 - 1.30 mg/dL    BUN/Creatinine ratio 27 (H) 12 - 20      GFR est AA >60 >60 ml/min/1.73m2    GFR est non-AA >60 >60 ml/min/1.73m2    Calcium 10.8 (H) 8.5 - 10.1 mg/dL   PTT    Collection Time: 09/12/20  4:45 PM   Result Value Ref Range    aPTT 31.9 25.0 - 37.1 sec    aPTT, therapeutic range   68 - 109 sec   URINE MICROSCOPIC    Collection Time: 09/12/20  4:45 PM   Result Value Ref Range    WBC >100 (H) 0 - 5 /hpf    RBC 0-5 0 - 3 /hpf    Bacteria 4+ (A) Negative /hpf   CBC WITH AUTOMATED DIFF    Collection Time: 09/13/20  8:03 AM   Result Value Ref Range    WBC 18.7 (H) 4.4 - 11.3 K/uL    RBC 3.88 (L) 4.50 - 5.90 M/uL    HGB 11.2 (L) 13.5 - 17.5 %    HCT 32.7 (L) 41 - 53 %    MCV 84.1 80 - 100 FL    MCH 28.8 (L) 31 - 34 PG    MCHC 34.2 31.0 - 36.0 g/dL    RDW 13.7 11.5 - 14.5 %    PLATELET 786 K/uL    MPV 7.4 6.5 - 11.5 FL    NRBC 30.0  WBC    ABSOLUTE NRBC 0.06 K/uL    ABS. MONOCYTES PENDING K/UL    NEUTROPHILS 84 (H) 42 - 75 %    LYMPHOCYTES 5 (L) 20.5 - 51.1 %    MONOCYTES 11 (H) 1.7 - 9.3 %    EOSINOPHILS 0 (L) 0.9 - 2.9 %    BASOPHILS 0 0.0 - 2.5 %    ABS. NEUTROPHILS 15.5 (H) 1.8 - 7.7 K/UL    ABS. LYMPHOCYTES 1.0 1.0 - 4.8 K/UL    ABS. EOSINOPHILS 0.1 0.0 - 0.7 K/UL    ABS. BASOPHILS 0.0 0.0 - 0.2 K/UL   METABOLIC PANEL, BASIC    Collection Time: 09/13/20  8:03 AM   Result Value Ref Range    Sodium 136 136 - 145 mmol/L    Potassium 3.7 3.5 - 5.1 mmol/L    Chloride 100 97 - 108 mmol/L    CO2 31 21 - 32 mmol/L    Anion gap 5 5 - 15 mmol/L    Glucose 131 (H) 65 - 100 mg/dL    BUN 24 (H) 6 - 20 mg/dL    Creatinine 0.92 0.70 - 1.30 mg/dL    BUN/Creatinine ratio 26 (H) 12 - 20      GFR est AA >60 >60 ml/min/1.73m2    GFR est non-AA >60 >60 ml/min/1.73m2    Calcium 10.1 8.5 - 10.1 mg/dL        Imaging Last 24 Hours:  Ct Abd Pelv Wo Cont    Result Date: 9/12/2020  Fever. Sepsis. TECHNIQUE: Axial imaging abdomen and pelvis without IV or oral contrast. Dose reduction: All CT scans at this facility are performed using dose reduction optimization techniques as appropriate to a performed exam including the following: Automated exposure control, adjustments of the mA and/or kV according to patient's size, or use of iterative reconstruction technique.  FINDINGS: Small left greater than right pleural effusions and adjacent airspace disease. Gallbladder absent. Liver, spleen, pancreas, adrenal glands unremarkable for noncontrast technique. Punctate nonobstructing bilateral renal calcifications. No hydronephrosis or hydroureter. The bladder contains a Ferrell catheter, air urine level, and large calcifications. The bladder wall is circumferentially thickened. Mild prostate enlargement. Calcified abdominal aorta and branch vessels. No abdominal aortic aneurysm. Moderate volume colonic stool. Colonic diverticulosis. No diverticulitis or bowel dilation. The appendix is not separately identified. No free air, free fluid, lymphadenopathy. Degenerative changes about the bony structures. IMPRESSION: 1. Small left greater than right pleural effusions and adjacent atelectasis or pneumonia given the appropriate clinical setting. 2. Bladder wall thickening from cystitis or outlet obstruction. The bladder contains air-urine level, Ferrell catheter, large calcifications. 3. Nonobstructing bilateral nephrolithiasis. 4. Colonic diverticulosis. No diverticulitis or bowel dilation. 5. Other findings above. Xr Chest Port    Result Date: 9/12/2020  Single frontal view of the chest. No prior films for comparison. Heart size is normal. Lungs are clear of infiltrate. No pulmonary nodule or pleural effusion is seen. There is minimal deformity of several left ribs consistent with old healed fractures. No acute bony abnormalities are identified. Impression: No acute abnormality demonstrated. Assessment//Plan           Sepsis ([address]  -Leukocytosis with likely primary source from Ferrell catheter  -No fever but otherwise meets criteria for sepsis  -IV fluids 0.9% normal saline 125 an hour  -Initiate Levaquin 750 mg IV piggyback daily  -Urine and blood cultures pending    9/13/2020  Blood cultures pending.   No urine culture is noted so will obtain the and may be sterile now that patient has had antibiotics. White blood cell count is down previous we will continue to monitor and continue with antibiotics. UTI (urinary tract infection)  -Leukocytosis with likely primary source from Ferrell catheter  -No fever but otherwise meets criteria for sepsis  -IV fluids 0.9% normal saline 125 an hour  -Initiate Levaquin 750 mg IV piggyback daily  -Urine and blood cultures pending  -Urine is cloudy, with small blood and moderate leukoesterase and negative nitrites and +4 bacteria    Normal sodium and potassium however patient does have a mildly elevated glucose with elevated BUN while creatinine is normal.  Calcium is also mildly elevated which we will follow closely. Portable chest shows no acute findings. As noted leukocytosis present with WBCs at 20.2 decreased H&H at 12.2/35.9, respectively. Platelets are normal at 366    9/13/2020  No urine culture is noted so will obtain the and may be sterile now that patient has had antibiotics. Anxiety  -Continue with clonazepam as needed  -Otherwise stable    Tobacco abuse  -Nicotine patch has been offered  -Patient has refused    Spinal cord abscess  -Suspicion about whether related to previous history of IV drug abuse  -Patient remains afebrile however leukocytosis suspect associated with UTI/urosepsis  -Abdomen pelvic CT obtained to evaluate possible spinal cord acute changes    9/13/2020  Pt has had temp up to 103 and is being treated with ibuprofen.   Continue antibiotics continue antipyretics    Alcoholism ([facility] [address]  -CIWA less than 6  -Low risk for withdrawal                     Electronically signed by Dmitriy Godinez MD on 9/13/2020 at 8:58 AM

## 2020-09-13 NOTE — ASSESSMENT & PLAN NOTE
Blood cultures pending. No urine culture is noted so will obtain the and may be sterile now that patient has had antibiotics. White blood cell count is down previous we will continue to monitor and continue with antibiotics.

## 2020-09-14 LAB
ANION GAP SERPL CALC-SCNC: 8 MMOL/L (ref 5–15)
BUN SERPL-MCNC: 18 MG/DL (ref 6–20)
BUN/CREAT SERPL: 22 (ref 12–20)
CA-I BLD-MCNC: 9.9 MG/DL (ref 8.5–10.1)
CHLORIDE SERPL-SCNC: 103 MMOL/L (ref 97–108)
CO2 SERPL-SCNC: 26 MMOL/L (ref 21–32)
CREAT SERPL-MCNC: 0.83 MG/DL (ref 0.7–1.3)
ERYTHROCYTE [DISTWIDTH] IN BLOOD BY AUTOMATED COUNT: 13.8 % (ref 11.5–14.5)
GLUCOSE SERPL-MCNC: 106 MG/DL (ref 65–100)
HCT VFR BLD AUTO: 31.3 % (ref 41–53)
HGB BLD-MCNC: 10.7 % (ref 13.5–17.5)
MCH RBC QN AUTO: 28.8 PG (ref 31–34)
MCHC RBC AUTO-ENTMCNC: 34.1 G/DL (ref 31–36)
MCV RBC AUTO: 84.4 FL (ref 80–100)
PLATELET # BLD AUTO: 341 K/UL
PMV BLD AUTO: 7.4 FL (ref 6.5–11.5)
POTASSIUM SERPL-SCNC: 3 MMOL/L (ref 3.5–5.1)
RBC # BLD AUTO: 3.7 M/UL (ref 4.5–5.9)
SODIUM SERPL-SCNC: 137 MMOL/L (ref 136–145)
WBC # BLD AUTO: 12.8 K/UL (ref 4.4–11.3)

## 2020-09-14 PROCEDURE — 94760 N-INVAS EAR/PLS OXIMETRY 1: CPT

## 2020-09-14 PROCEDURE — 85027 COMPLETE CBC AUTOMATED: CPT

## 2020-09-14 PROCEDURE — 74011000258 HC RX REV CODE- 258: Performed by: INTERNAL MEDICINE

## 2020-09-14 PROCEDURE — 74011250637 HC RX REV CODE- 250/637: Performed by: INTERNAL MEDICINE

## 2020-09-14 PROCEDURE — 74011250636 HC RX REV CODE- 250/636: Performed by: INTERNAL MEDICINE

## 2020-09-14 PROCEDURE — 74011250636 HC RX REV CODE- 250/636: Performed by: PHYSICIAN ASSISTANT

## 2020-09-14 PROCEDURE — 80048 BASIC METABOLIC PNL TOTAL CA: CPT

## 2020-09-14 PROCEDURE — 65660000000 HC RM CCU STEPDOWN

## 2020-09-14 PROCEDURE — 87635 SARS-COV-2 COVID-19 AMP PRB: CPT

## 2020-09-14 PROCEDURE — 74011250637 HC RX REV CODE- 250/637: Performed by: FAMILY MEDICINE

## 2020-09-14 PROCEDURE — 65270000029 HC RM PRIVATE

## 2020-09-14 PROCEDURE — 74011250637 HC RX REV CODE- 250/637: Performed by: PHYSICIAN ASSISTANT

## 2020-09-14 RX ORDER — CLONAZEPAM 1 MG/1
0.25 TABLET ORAL EVERY 12 HOURS
Status: DISCONTINUED | OUTPATIENT
Start: 2020-09-14 | End: 2020-09-14

## 2020-09-14 RX ORDER — ACETAMINOPHEN 325 MG/1
650 TABLET ORAL ONCE
Status: DISPENSED | OUTPATIENT
Start: 2020-09-14 | End: 2020-09-14

## 2020-09-14 RX ORDER — POTASSIUM CHLORIDE 750 MG/1
20 TABLET, FILM COATED, EXTENDED RELEASE ORAL 2 TIMES DAILY
Status: DISCONTINUED | OUTPATIENT
Start: 2020-09-14 | End: 2020-09-14

## 2020-09-14 RX ORDER — LANOLIN ALCOHOL/MO/W.PET/CERES
400 CREAM (GRAM) TOPICAL 2 TIMES DAILY
Status: DISPENSED | OUTPATIENT
Start: 2020-09-14 | End: 2020-09-15

## 2020-09-14 RX ORDER — POTASSIUM CHLORIDE 20 MEQ/1
20 TABLET, EXTENDED RELEASE ORAL 2 TIMES DAILY WITH MEALS
Status: DISCONTINUED | OUTPATIENT
Start: 2020-09-14 | End: 2020-09-15

## 2020-09-14 RX ORDER — CLONAZEPAM 0.5 MG/1
0.25 TABLET ORAL EVERY 12 HOURS
Status: DISCONTINUED | OUTPATIENT
Start: 2020-09-14 | End: 2020-09-18 | Stop reason: HOSPADM

## 2020-09-14 RX ADMIN — FAMOTIDINE 20 MG: 20 TABLET, FILM COATED ORAL at 21:12

## 2020-09-14 RX ADMIN — POTASSIUM CHLORIDE 20 MEQ: 1500 TABLET, EXTENDED RELEASE ORAL at 12:38

## 2020-09-14 RX ADMIN — MAGNESIUM OXIDE 400 MG: 400 TABLET ORAL at 12:38

## 2020-09-14 RX ADMIN — GABAPENTIN 300 MG: 300 CAPSULE ORAL at 05:49

## 2020-09-14 RX ADMIN — TAMSULOSIN HYDROCHLORIDE 0.4 MG: 0.4 CAPSULE ORAL at 09:30

## 2020-09-14 RX ADMIN — ACETAMINOPHEN 650 MG: 325 TABLET ORAL at 05:07

## 2020-09-14 RX ADMIN — CLONAZEPAM 0.25 MG: 0.5 TABLET ORAL at 23:24

## 2020-09-14 RX ADMIN — IBUPROFEN 600 MG: 600 TABLET, FILM COATED ORAL at 21:11

## 2020-09-14 RX ADMIN — CLONAZEPAM 0.25 MG: 0.5 TABLET ORAL at 13:39

## 2020-09-14 RX ADMIN — VITAMIN D, TAB 1000IU (100/BT): 25 TAB at 09:31

## 2020-09-14 RX ADMIN — Medication 10 ML: at 22:00

## 2020-09-14 RX ADMIN — ASPIRIN 81 MG 81 MG: 81 TABLET ORAL at 09:30

## 2020-09-14 RX ADMIN — GABAPENTIN 300 MG: 300 CAPSULE ORAL at 21:11

## 2020-09-14 RX ADMIN — MAGNESIUM OXIDE 400 MG: 400 TABLET ORAL at 17:35

## 2020-09-14 RX ADMIN — CEFEPIME 2 G: 2 INJECTION, POWDER, FOR SOLUTION INTRAVENOUS at 09:31

## 2020-09-14 RX ADMIN — CEFEPIME 2 G: 2 INJECTION, POWDER, FOR SOLUTION INTRAVENOUS at 01:30

## 2020-09-14 RX ADMIN — THIAMINE HCL TAB 100 MG 100 MG: 100 TAB at 09:30

## 2020-09-14 RX ADMIN — CEFEPIME 2 G: 2 INJECTION, POWDER, FOR SOLUTION INTRAVENOUS at 17:35

## 2020-09-14 RX ADMIN — LEVOFLOXACIN 750 MG: 5 INJECTION, SOLUTION INTRAVENOUS at 23:24

## 2020-09-14 RX ADMIN — MULTIVITAMIN TABLET 1 TABLET: TABLET at 09:30

## 2020-09-14 RX ADMIN — GABAPENTIN 300 MG: 300 CAPSULE ORAL at 13:39

## 2020-09-14 RX ADMIN — FAMOTIDINE 20 MG: 20 TABLET, FILM COATED ORAL at 09:30

## 2020-09-14 RX ADMIN — POTASSIUM CHLORIDE 20 MEQ: 1500 TABLET, EXTENDED RELEASE ORAL at 17:35

## 2020-09-14 RX ADMIN — SODIUM CHLORIDE 150 ML/HR: 9 INJECTION, SOLUTION INTRAVENOUS at 02:11

## 2020-09-14 RX ADMIN — ENOXAPARIN SODIUM 40 MG: 40 INJECTION SUBCUTANEOUS at 09:31

## 2020-09-14 RX ADMIN — SODIUM CHLORIDE 150 ML/HR: 9 INJECTION, SOLUTION INTRAVENOUS at 12:37

## 2020-09-14 NOTE — PROGRESS NOTES
Hospitalist Progress Note  Mihaela Brown MD          Date of Service:  2020  NAME:  Shani Owens  :  1965  MRN:  [id number]      Admission Summary:     Patient reports that he has been feeling just poorly in the preceding 2 weeks prior to his arrival in the emergency department. He does have a indwelling Ferrell catheter as this is required secondary to the paraplegia. 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 He is presently a resident in a rehab/skilled nursing facility and had been making progress where he was able to feed himself until about 2 weeks ago where he felt weak and reduced arm strength.   The patient was evaluated in the emergency department and found to have cloudy urine with small blood and moderate leukoesterase and negative nitrites and +4 bacteria.     Interval history / Subjective:       Patient has no acute complaint. Denies any abdominal pain. Denies any shortness of breath.      Assessment & Plan:     Sepsis  -Patient presents with fever, tachycardia and leukocytosis meeting sepsis criteria  -Likely source is urinary tract infection, given patient is a resident of long-term care facility and chest x-ray showing infiltrate in the lung base, will test for COVID  -Follow cultures    Urinary tract infection  -Urinalysis shows pyuria, urine culture result pending  -New current antibiotics with cefepime and Levaquin    Anemia  -May be hemo-delusional  Continue monitor H&H-    Electrolyte abnormalities  -Patient with hypomagnesemia and hypokalemia  -Replacing  -Follow electrolytes    Anxiety  -Continue lorazepam    Decubitus ulcer  -Stage IV sacral decubitus ulcer  -Wound management    Code status: FULL  DVT prophylaxis: Lovenox    Care Plan discussed with: Patient/Family, Nurse and   Anticipated Disposition: SNF/LTC  Anticipated Discharge: 24 hours to 48 hours     Hospital Problems  Date Reviewed: 9/12/2020          Codes Class Noted POA    * (Principal) Sepsis ([facility] [address] ICD-10-CM: A41.9  ICD-9-CM: 038.9, 995.91  9/12/2020 Yes        UTI (urinary tract infection) ICD-10-CM: N39.0  ICD-9-CM: 599.0  9/12/2020 Yes        Anxiety ICD-10-CM: F41.9  ICD-9-CM: 300.00  7/20/2020 Yes        Spinal cord abscess ICD-10-CM: G06.1  ICD-9-CM: 324.1  4/8/2020 Yes        Alcoholism ([facility] [address] ICD-10-CM: F10.20  ICD-9-CM: 303.90  3/17/2020 Yes        Tobacco abuse ICD-10-CM: Z72.0  ICD-9-CM: 305.1  3/17/2020 Yes                Review of Systems:   A comprehensive review of systems was negative except for that written in the HPI. Vital Signs:    Last 24hrs VS reviewed since prior progress note. Most recent are:  Visit Vitals  /76   Pulse 89   Temp (!) 100.7 °F (38.2 °C)   Resp 22   Ht 6' (1.829 m)   Wt 58.1 kg (128 lb 1.6 oz)   SpO2 95%   BMI 17.37 kg/m²         Intake/Output Summary (Last 24 hours) at 9/14/2020 1011  Last data filed at 9/14/2020 1845  Gross per 24 hour   Intake 5049 ml   Output 1125 ml   Net 3924 ml        Physical Examination:             Constitutional:  No acute distress, cooperative, pleasant    ENT:  Oral mucosa moist, oropharynx benign. Resp:  CTA bilaterally. No wheezing/rhonchi/rales. No accessory muscle use   CV:  Regular rhythm, normal rate, no murmurs, gallops, rubs    GI:  Soft, non distended, non tender. normoactive bowel sounds, no hepatosplenomegaly     Musculoskeletal:  No edema, warm, 2+ pulses throughout    Neurologic:  Moves all extremities.   AAOx3, CN II-XII reviewed     Skin:  Stage IV sacral decubitus ulcer       Data Review:    Review and/or order of clinical lab test      Labs:     Recent Labs     09/14/20  0520 09/13/20  0803   WBC 12.8* 18.7*   HGB 10.7* 11.2*   HCT 31.3* 32.7*    339     Recent Labs     09/14/20  0520 09/13/20  1135 09/13/20  0803 09/12/20  1645     --  136 136   K 3.0*  --  3.7 3.5     --  100 97   CO2 26  --  31 32   BUN 18  --  24* 30*   CREA 0.83  --  0.92 1.10   *  --  131* 126*   CA 9.9  --  10.1 10.8*   MG  --  1.2*  --   --      No results for input(s): ALT, AP, TBIL, TBILI, TP, ALB, GLOB, GGT, AML, LPSE in the last 72 hours. No lab exists for component: SGOT, GPT, AMYP, HLPSE  Recent Labs     09/12/20  1645   APTT 31.9      No results for input(s): FE, TIBC, PSAT, FERR in the last 72 hours. No results found for: FOL, RBCF   No results for input(s): PH, PCO2, PO2 in the last 72 hours. No results for input(s): CPK, CKNDX, TROIQ in the last 72 hours.     No lab exists for component: CPKMB  No results found for: CHOL, CHOLX, CHLST, CHOLV, HDL, HDLP, LDL, LDLC, DLDLP, TGLX, TRIGL, TRIGP, CHHD, CHHDX  No results found for: Fredis Sewell  Lab Results   Component Value Date/Time    Color Yellow/Straw 09/12/2020 04:45 PM    Appearance Cloudy (A) 09/12/2020 04:45 PM    Specific gravity 1.025 09/12/2020 04:45 PM    pH (UA) 6.0 09/12/2020 04:45 PM    Protein 30 (A) 09/12/2020 04:45 PM    Glucose Negative 09/12/2020 04:45 PM    Ketone Negative 09/12/2020 04:45 PM    Bilirubin Negative 09/12/2020 04:45 PM    Urobilinogen 0.2 09/12/2020 04:45 PM    Nitrites Negative 09/12/2020 04:45 PM    Leukocyte Esterase Moderate (A) 09/12/2020 04:45 PM    Bacteria 4+ (A) 09/12/2020 04:45 PM    WBC >100 (H) 09/12/2020 04:45 PM    RBC 0-5 09/12/2020 04:45 PM         Medications Reviewed:     Current Facility-Administered Medications   Medication Dose Route Frequency    clonazePAM (KlonoPIN) tablet 0.25 mg  0.25 mg Oral Q12H    potassium chloride SR (KLOR-CON 10) tablet 20 mEq  20 mEq Oral BID    albuterol-ipratropium (DUO-NEB) 2.5 MG-0.5 MG/3 ML  3 mL Nebulization Q6H PRN    aspirin chewable tablet 81 mg  81 mg Oral DAILY    cholecalciferol (VITAMIN D3) (1000 Units /25 mcg) tablet 0.5 Tab  500 Units Oral DAILY    cyclobenzaprine (FLEXERIL) tablet 5 mg  5 mg Oral Q8H PRN    famotidine (PEPCID) tablet 20 mg  20 mg Oral Q12H    gabapentin (NEURONTIN) capsule 300 mg  300 mg Oral Q8H    multivitamin (ONE A DAY) tablet 1 Tab  1 Tab Oral DAILY    tamsulosin (FLOMAX) capsule 0.4 mg  0.4 mg Oral DAILY    thiamine mononitrate (B-1) tablet 100 mg  100 mg Oral DAILY    sodium chloride (NS) flush 5-40 mL  5-40 mL IntraVENous Q8H    sodium chloride (NS) flush 5-40 mL  5-40 mL IntraVENous PRN    polyethylene glycol (MIRALAX) packet 17 g  17 g Oral DAILY PRN    promethazine (PHENERGAN) tablet 12.5 mg  12.5 mg Oral Q6H PRN    Or    ondansetron (ZOFRAN) injection 4 mg  4 mg IntraVENous Q6H PRN    enoxaparin (LOVENOX) injection 40 mg  40 mg SubCUTAneous DAILY    oxyCODONE-acetaminophen (PERCOCET) 5-325 mg per tablet 1 Tab  1 Tab Oral BID PRN    ibuprofen (MOTRIN) tablet 600 mg  600 mg Oral Q8H PRN    0.9% sodium chloride infusion  150 mL/hr IntraVENous CONTINUOUS    cefepime (MAXIPIME) 2 g in 0.9% sodium chloride (MBP/ADV) 100 mL MBP  2 g IntraVENous Q8H    levoFLOXacin (LEVAQUIN) 750 mg in D5W IVPB  750 mg IntraVENous Q24H     ______________________________________________________________________  EXPECTED LENGTH OF STAY: - - -  ACTUAL LENGTH OF STAY:          2                 Ruben Vega MD

## 2020-09-14 NOTE — PROGRESS NOTES
Physical Therapy evaluation received. DPT spoke with MD who states patient is bed bound and is currently not a candidate for physical therapy. No physical therapy evaluation to be completed at this time.      Elvia Coburn, PT, DPT

## 2020-09-15 LAB
ANION GAP SERPL CALC-SCNC: 7 MMOL/L (ref 5–15)
BASOPHILS # BLD: 0 K/UL (ref 0–0.2)
BASOPHILS NFR BLD: 0 % (ref 0–2.5)
BUN SERPL-MCNC: 13 MG/DL (ref 6–20)
BUN/CREAT SERPL: 21 (ref 12–20)
CA-I BLD-MCNC: 10 MG/DL (ref 8.5–10.1)
CHLORIDE SERPL-SCNC: 106 MMOL/L (ref 97–108)
CO2 SERPL-SCNC: 26 MMOL/L (ref 21–32)
CREAT SERPL-MCNC: 0.61 MG/DL (ref 0.7–1.3)
EOSINOPHIL # BLD: 0.6 K/UL (ref 0–0.7)
EOSINOPHIL NFR BLD: 4 % (ref 0.9–2.9)
ERYTHROCYTE [DISTWIDTH] IN BLOOD BY AUTOMATED COUNT: 14 % (ref 11.5–14.5)
GLUCOSE SERPL-MCNC: 99 MG/DL (ref 65–100)
HCT VFR BLD AUTO: 33.3 % (ref 41–53)
HGB BLD-MCNC: 11.4 % (ref 13.5–17.5)
LYMPHOCYTES # BLD: 1 K/UL (ref 1–4.8)
LYMPHOCYTES NFR BLD: 8 % (ref 20.5–51.1)
MCH RBC QN AUTO: 28.8 PG (ref 31–34)
MCHC RBC AUTO-ENTMCNC: 34.3 G/DL (ref 31–36)
MCV RBC AUTO: 84.1 FL (ref 80–100)
MONOCYTES # BLD: 1.2 K/UL (ref 0.2–2.4)
MONOCYTES NFR BLD: 9 % (ref 1.7–9.3)
NEUTS SEG # BLD: 10.7 K/UL (ref 1.8–7.7)
NEUTS SEG NFR BLD: 79 % (ref 42–75)
NRBC # BLD: 0 K/UL
NRBC BLD-RTO: 0 PER 100 WBC
PLATELET # BLD AUTO: 350 K/UL
PMV BLD AUTO: 7.4 FL (ref 6.5–11.5)
POTASSIUM SERPL-SCNC: 3.4 MMOL/L (ref 3.5–5.1)
RBC # BLD AUTO: 3.97 M/UL (ref 4.5–5.9)
SODIUM SERPL-SCNC: 139 MMOL/L (ref 136–145)
WBC # BLD AUTO: 13.6 K/UL (ref 4.4–11.3)

## 2020-09-15 PROCEDURE — 80048 BASIC METABOLIC PNL TOTAL CA: CPT

## 2020-09-15 PROCEDURE — 74011250637 HC RX REV CODE- 250/637: Performed by: INTERNAL MEDICINE

## 2020-09-15 PROCEDURE — 74011250636 HC RX REV CODE- 250/636: Performed by: INTERNAL MEDICINE

## 2020-09-15 PROCEDURE — 65660000000 HC RM CCU STEPDOWN

## 2020-09-15 PROCEDURE — 74011000258 HC RX REV CODE- 258: Performed by: INTERNAL MEDICINE

## 2020-09-15 PROCEDURE — 74011250637 HC RX REV CODE- 250/637: Performed by: PHYSICIAN ASSISTANT

## 2020-09-15 PROCEDURE — 36415 COLL VENOUS BLD VENIPUNCTURE: CPT

## 2020-09-15 PROCEDURE — 65270000029 HC RM PRIVATE

## 2020-09-15 PROCEDURE — 85025 COMPLETE CBC W/AUTO DIFF WBC: CPT

## 2020-09-15 PROCEDURE — 74011250637 HC RX REV CODE- 250/637: Performed by: FAMILY MEDICINE

## 2020-09-15 RX ORDER — POTASSIUM CHLORIDE 750 MG/1
20 TABLET, FILM COATED, EXTENDED RELEASE ORAL DAILY
Status: DISCONTINUED | OUTPATIENT
Start: 2020-09-15 | End: 2020-09-15

## 2020-09-15 RX ORDER — POTASSIUM CHLORIDE 750 MG/1
20 TABLET, FILM COATED, EXTENDED RELEASE ORAL 2 TIMES DAILY
Status: DISCONTINUED | OUTPATIENT
Start: 2020-09-15 | End: 2020-09-18 | Stop reason: HOSPADM

## 2020-09-15 RX ADMIN — Medication 10 ML: at 22:19

## 2020-09-15 RX ADMIN — FAMOTIDINE 20 MG: 20 TABLET, FILM COATED ORAL at 09:06

## 2020-09-15 RX ADMIN — GABAPENTIN 300 MG: 300 CAPSULE ORAL at 22:19

## 2020-09-15 RX ADMIN — THIAMINE HCL TAB 100 MG 100 MG: 100 TAB at 09:05

## 2020-09-15 RX ADMIN — Medication 10 ML: at 05:24

## 2020-09-15 RX ADMIN — VITAMIN D, TAB 1000IU (100/BT) 0.5 TABLET: 25 TAB at 09:06

## 2020-09-15 RX ADMIN — CLONAZEPAM 0.25 MG: 0.5 TABLET ORAL at 22:19

## 2020-09-15 RX ADMIN — MAGNESIUM OXIDE 400 MG: 400 TABLET ORAL at 17:35

## 2020-09-15 RX ADMIN — POLYETHYLENE GLYCOL 3350 17 G: 17 POWDER, FOR SOLUTION ORAL at 17:35

## 2020-09-15 RX ADMIN — POTASSIUM CHLORIDE 20 MEQ: 750 TABLET, FILM COATED, EXTENDED RELEASE ORAL at 17:35

## 2020-09-15 RX ADMIN — TAMSULOSIN HYDROCHLORIDE 0.4 MG: 0.4 CAPSULE ORAL at 09:05

## 2020-09-15 RX ADMIN — POTASSIUM CHLORIDE 20 MEQ: 1500 TABLET, EXTENDED RELEASE ORAL at 09:05

## 2020-09-15 RX ADMIN — CEFEPIME 2 G: 2 INJECTION, POWDER, FOR SOLUTION INTRAVENOUS at 09:06

## 2020-09-15 RX ADMIN — CEFEPIME 2 G: 2 INJECTION, POWDER, FOR SOLUTION INTRAVENOUS at 17:35

## 2020-09-15 RX ADMIN — MULTIVITAMIN TABLET 1 TABLET: TABLET at 09:06

## 2020-09-15 RX ADMIN — FAMOTIDINE 20 MG: 20 TABLET, FILM COATED ORAL at 22:19

## 2020-09-15 RX ADMIN — GABAPENTIN 300 MG: 300 CAPSULE ORAL at 14:03

## 2020-09-15 RX ADMIN — CLONAZEPAM 0.25 MG: 0.5 TABLET ORAL at 09:06

## 2020-09-15 RX ADMIN — ASPIRIN 81 MG 81 MG: 81 TABLET ORAL at 09:06

## 2020-09-15 RX ADMIN — CEFEPIME 2 G: 2 INJECTION, POWDER, FOR SOLUTION INTRAVENOUS at 03:27

## 2020-09-15 RX ADMIN — SODIUM CHLORIDE 150 ML/HR: 9 INJECTION, SOLUTION INTRAVENOUS at 03:31

## 2020-09-15 RX ADMIN — MAGNESIUM OXIDE 400 MG: 400 TABLET ORAL at 09:06

## 2020-09-15 RX ADMIN — Medication 5 ML: at 14:03

## 2020-09-15 RX ADMIN — SODIUM CHLORIDE 150 ML/HR: 9 INJECTION, SOLUTION INTRAVENOUS at 16:17

## 2020-09-15 RX ADMIN — CYCLOBENZAPRINE 5 MG: 10 TABLET, FILM COATED ORAL at 17:35

## 2020-09-15 RX ADMIN — ENOXAPARIN SODIUM 40 MG: 40 INJECTION SUBCUTANEOUS at 09:06

## 2020-09-15 RX ADMIN — LEVOFLOXACIN 750 MG: 5 INJECTION, SOLUTION INTRAVENOUS at 22:19

## 2020-09-15 RX ADMIN — GABAPENTIN 300 MG: 300 CAPSULE ORAL at 05:24

## 2020-09-15 NOTE — PROGRESS NOTES
SPOKE WITH DR Mcneill Loud VIA PHONE ABOUT PATIENTS NUTRITION. ORDER RECEIVED FOR NUTRITIONAL CONSULT. ALSO SPOKE WITH [facility] DIETICIAN. ASKED ABOUT PATIENT HAVING ENSURE PUDDING OR MAGIC CUPS.  ISABEL WILL EVALUATE PATIENT

## 2020-09-15 NOTE — PROGRESS NOTES
Comprehensive Nutrition Assessment    Type and Reason for Visit: Initial    Nutrition Recommendations/Plan:   Change current diet to finger foods cardiac diet   Nutrition supplements: Jose Alfredo at B/D (wound healing), magic cup @ L/D, pudding for am & pm snack   Will follow up x3days    Nutrition Assessment:  underweight, low BMI    Malnutrition Assessment:  Malnutrition Status:       Context:  Chronic illness(paraplegia)         Estimated Daily Nutrient Needs:  Energy (kcal):  1,450-1,750kcals  Protein (g):  69-87g       Fluid (ml/day):  1,450ml    Nutrition Related Findings:  NFPE unable to be perform due to precautions of  covid 19      Wounds:    Stage IV       Current Nutrition Therapies:   DIET CARDIAC Regular    Anthropometric Measures:  · Height:  6' (182.9 cm)  · Current Body Wt:  58.1 kg (128 lb 1.4 oz)   · Admission Body Wt:  128 lb    · Usual Body Wt:        · Ideal Body Wt:  178 lbs:  72 %   · Adjusted Body Weight:  137.7; Weight Adjustment for: Paraplegia   · Adjusted BMI:  18.7    · BMI Category:  Underweight (BMI less than 22) age over 72       Nutrition Diagnosis:   · Moderate malnutrition related to other (specify)(poor po intake prior to adm) as evidenced by intake 0-25%, other (specify)(low bmi)      Nutrition Interventions:   Food and/or Nutrient Delivery: Start oral nutrition supplement, Modify current diet(change cardiac finger foods diet, nutrition supplements: Jose Alfredo @ B/D (wound healing), ensure pudding and magic cup)  Nutrition Education and Counseling: Other (specify)(unable due to precautions of coivid 19)  Coordination of Nutrition Care: Other (specify)(request a total protien lab value)    Goals:  50-75% at most meals, >75% nutrition supplements, no further wt.  Loss   Monitor and Record daily wts & PO intakes       Nutrition Monitoring and Evaluation:   Behavioral-Environmental Outcomes:    Food/Nutrient Intake Outcomes: Food and nutrient intake, Supplement intake(currently on MVI)  Physical Signs/Symptoms Outcomes:      Discharge Planning:     Too soon to determine

## 2020-09-15 NOTE — PROGRESS NOTES
Hospitalist Progress Note  Ton Morgan MD          Date of Service:  9/15/2020  NAME:  Marilee Cline  :  1965  MRN:  [id number]      Admission Summary:     Patient reports that he has been feeling just poorly in the preceding 2 weeks prior to his arrival in the emergency department. He does have a indwelling Ferrell catheter as this is required secondary to the paraplegia. 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 He is presently a resident in a rehab/skilled nursing facility and had been making progress where he was able to feed himself until about 2 weeks ago where he felt weak and reduced arm strength.   The patient was evaluated in the emergency department and found to have cloudy urine with small blood and moderate leukoesterase and negative nitrites and +4 bacteria.     Interval history / Subjective:       Patient has no acute complaint. Denies any abdominal pain. Denies any shortness of breath.      Assessment & Plan:     Sepsis  -Patient presents with fever, tachycardia and leukocytosis meeting sepsis criteria  -Likely source is urinary tract infection, given patient is a resident of long-term care facility and chest x-ray showing infiltrate in the lung base  -Follow cultures, COVID result pending    Urinary tract infection  -Urinalysis shows pyuria, urine culture gram-negative rods  -Continue current antibiotics with cefepime and Levaquin    Anemia  -May be hemo-delusional  Continue monitor H&H-    Electrolyte abnormalities  -Patient with hypomagnesemia and hypokalemia  -Replacing  -Follow electrolytes    Anxiety  -Continue lorazepam    Decubitus ulcer  -Stage IV sacral decubitus ulcer  -Wound management    Code status: FULL  DVT prophylaxis: Lovenox    Care Plan discussed with: Patient/Family, Nurse and   Anticipated Disposition: SNF/LTC  Anticipated Discharge: 24 hours to 48 hours     Hospital Problems  Date Reviewed: 9/12/2020          Codes Class Noted POA    * (Principal) Sepsis ([facility] ICD-10-CM: A41.9  ICD-9-CM: 038.9, 995.91  9/12/2020 Yes        UTI (urinary tract infection) ICD-10-CM: N39.0  ICD-9-CM: 599.0  9/12/2020 Yes        Anxiety ICD-10-CM: F41.9  ICD-9-CM: 300.00  7/20/2020 Yes        Spinal cord abscess ICD-10-CM: G06.1  ICD-9-CM: 324.1  4/8/2020 Yes        Alcoholism ([facility] ICD-10-CM: F10.20  ICD-9-CM: 303.90  3/17/2020 Yes        Tobacco abuse ICD-10-CM: Z72.0  ICD-9-CM: 305.1  3/17/2020 Yes                Review of Systems:   A comprehensive review of systems was negative except for that written in the HPI. Vital Signs:    Last 24hrs VS reviewed since prior progress note. Most recent are:  Visit Vitals  BP (!) 131/105   Pulse 89   Temp 98.8 °F (37.1 °C)   Resp 22   Ht 6' (1.829 m)   Wt 58.1 kg (128 lb 1.6 oz)   SpO2 98%   BMI 17.37 kg/m²         Intake/Output Summary (Last 24 hours) at 9/15/2020 0950  Last data filed at 9/15/2020 0230  Gross per 24 hour   Intake    Output 1400 ml   Net -1400 ml        Physical Examination:             Constitutional:  No acute distress, cooperative, pleasant    ENT:  Oral mucosa moist, oropharynx benign. Resp:  CTA bilaterally. No wheezing/rhonchi/rales. No accessory muscle use   CV:  Regular rhythm, normal rate, no murmurs, gallops, rubs    GI:  Soft, non distended, non tender. normoactive bowel sounds, no hepatosplenomegaly     Musculoskeletal:  No edema, warm, 2+ pulses throughout    Neurologic:  Moves all extremities.   AAOx3, CN II-XII reviewed     Skin:  Stage IV sacral decubitus ulcer       Data Review:    Review and/or order of clinical lab test      Labs:     Recent Labs     09/15/20  0616 09/14/20  0520   WBC 13.6* 12.8*   HGB 11.4* 10.7*   HCT 33.3* 31.3*    341     Recent Labs     09/15/20  0616 09/14/20  0520 09/13/20  1135 09/13/20  0803    137  --  136 K 3.4* 3.0*  --  3.7    103  --  100   CO2 26 26  --  31   BUN 13 18  --  24*   CREA 0.61* 0.83  --  0.92   GLU 99 106*  --  131*   CA 10.0 9.9  --  10.1   MG  --   --  1.2*  --      No results for input(s): ALT, AP, TBIL, TBILI, TP, ALB, GLOB, GGT, AML, LPSE in the last 72 hours. No lab exists for component: SGOT, GPT, AMYP, HLPSE  Recent Labs     09/12/20  1645   APTT 31.9      No results for input(s): FE, TIBC, PSAT, FERR in the last 72 hours. No results found for: FOL, RBCF   No results for input(s): PH, PCO2, PO2 in the last 72 hours. No results for input(s): CPK, CKNDX, TROIQ in the last 72 hours.     No lab exists for component: CPKMB  No results found for: CHOL, CHOLX, CHLST, CHOLV, HDL, HDLP, LDL, LDLC, DLDLP, TGLX, TRIGL, TRIGP, CHHD, CHHDX  No results found for: [facility]  Lab Results   Component Value Date/Time    Color Yellow/Straw 09/12/2020 04:45 PM    Appearance Cloudy (A) 09/12/2020 04:45 PM    Specific gravity 1.025 09/12/2020 04:45 PM    pH (UA) 6.0 09/12/2020 04:45 PM    Protein 30 (A) 09/12/2020 04:45 PM    Glucose Negative 09/12/2020 04:45 PM    Ketone Negative 09/12/2020 04:45 PM    Bilirubin Negative 09/12/2020 04:45 PM    Urobilinogen 0.2 09/12/2020 04:45 PM    Nitrites Negative 09/12/2020 04:45 PM    Leukocyte Esterase Moderate (A) 09/12/2020 04:45 PM    Bacteria 4+ (A) 09/12/2020 04:45 PM    WBC >100 (H) 09/12/2020 04:45 PM    RBC 0-5 09/12/2020 04:45 PM         Medications Reviewed:     Current Facility-Administered Medications   Medication Dose Route Frequency    potassium chloride SR (KLOR-CON 10) tablet 20 mEq  20 mEq Oral DAILY    magnesium oxide (MAG-OX) tablet 400 mg  400 mg Oral BID    potassium chloride (K-DUR, KLOR-CON) SR tablet 20 mEq  20 mEq Oral BID WITH MEALS    clonazePAM (KlonoPIN) tablet 0.25 mg  0.25 mg Oral Q12H    aspirin chewable tablet 81 mg  81 mg Oral DAILY    cholecalciferol (VITAMIN D3) (1000 Units /25 mcg) tablet 0.5 Tab  500 Units Oral DAILY  cyclobenzaprine (FLEXERIL) tablet 5 mg  5 mg Oral Q8H PRN    famotidine (PEPCID) tablet 20 mg  20 mg Oral Q12H    gabapentin (NEURONTIN) capsule 300 mg  300 mg Oral Q8H    multivitamin (ONE A DAY) tablet 1 Tab  1 Tab Oral DAILY    tamsulosin (FLOMAX) capsule 0.4 mg  0.4 mg Oral DAILY    thiamine mononitrate (B-1) tablet 100 mg  100 mg Oral DAILY    sodium chloride (NS) flush 5-40 mL  5-40 mL IntraVENous Q8H    sodium chloride (NS) flush 5-40 mL  5-40 mL IntraVENous PRN    polyethylene glycol (MIRALAX) packet 17 g  17 g Oral DAILY PRN    promethazine (PHENERGAN) tablet 12.5 mg  12.5 mg Oral Q6H PRN    Or    ondansetron (ZOFRAN) injection 4 mg  4 mg IntraVENous Q6H PRN    enoxaparin (LOVENOX) injection 40 mg  40 mg SubCUTAneous DAILY    ibuprofen (MOTRIN) tablet 600 mg  600 mg Oral Q8H PRN    0.9% sodium chloride infusion  150 mL/hr IntraVENous CONTINUOUS    cefepime (MAXIPIME) 2 g in 0.9% sodium chloride (MBP/ADV) 100 mL MBP  2 g IntraVENous Q8H    levoFLOXacin (LEVAQUIN) 750 mg in D5W IVPB  750 mg IntraVENous Q24H     ______________________________________________________________________  EXPECTED LENGTH OF STAY: - - -  ACTUAL LENGTH OF STAY:          3                 Andreia Bowser MD

## 2020-09-15 NOTE — PROGRESS NOTES
Pt with max temp of 101.5F overnight, motrin was given 1x with resolve, meds administered per mar, no acute changes overnight

## 2020-09-16 LAB
ALBUMIN SERPL-MCNC: 1.9 G/DL (ref 3.5–5)
ALBUMIN/GLOB SERPL: 0.4 {RATIO} (ref 1.1–2.2)
ALP SERPL-CCNC: 52 U/L (ref 45–117)
ALT SERPL-CCNC: 27 U/L (ref 12–78)
ANION GAP SERPL CALC-SCNC: 10 MMOL/L (ref 5–15)
AST SERPL W P-5'-P-CCNC: 16 U/L (ref 15–37)
BASOPHILS # BLD: 0.1 K/UL (ref 0–0.2)
BASOPHILS NFR BLD: 1 % (ref 0–2.5)
BILIRUB DIRECT SERPL-MCNC: 0.1 MG/DL (ref 0–0.2)
BILIRUB SERPL-MCNC: 0.4 MG/DL (ref 0.2–1)
BUN SERPL-MCNC: 10 MG/DL (ref 6–20)
BUN/CREAT SERPL: 16 (ref 12–20)
CA-I BLD-MCNC: 9.7 MG/DL (ref 8.5–10.1)
CHLORIDE SERPL-SCNC: 105 MMOL/L (ref 97–108)
CO2 SERPL-SCNC: 24 MMOL/L (ref 21–32)
CREAT SERPL-MCNC: 0.61 MG/DL (ref 0.7–1.3)
EOSINOPHIL # BLD: 0.4 K/UL (ref 0–0.7)
EOSINOPHIL NFR BLD: 3 % (ref 0.9–2.9)
ERYTHROCYTE [DISTWIDTH] IN BLOOD BY AUTOMATED COUNT: 13.8 % (ref 11.5–14.5)
GLOBULIN SER CALC-MCNC: 4.4 G/DL (ref 2–4)
GLUCOSE SERPL-MCNC: 99 MG/DL (ref 65–100)
HCT VFR BLD AUTO: 33.1 % (ref 41–53)
HGB BLD-MCNC: 11.1 % (ref 13.5–17.5)
LYMPHOCYTES # BLD: 1.3 K/UL (ref 1–4.8)
LYMPHOCYTES NFR BLD: 10 % (ref 20.5–51.1)
MAGNESIUM SERPL-MCNC: 1.3 MG/DL (ref 1.6–2.4)
MCH RBC QN AUTO: 28.4 PG (ref 31–34)
MCHC RBC AUTO-ENTMCNC: 33.7 G/DL (ref 31–36)
MCV RBC AUTO: 84.2 FL (ref 80–100)
MONOCYTES # BLD: 0.9 K/UL (ref 0.2–2.4)
MONOCYTES NFR BLD: 7 % (ref 1.7–9.3)
NEUTS SEG # BLD: 10.5 K/UL (ref 1.8–7.7)
NEUTS SEG NFR BLD: 79 % (ref 42–75)
NRBC # BLD: 0.01 K/UL
NRBC BLD-RTO: 0 PER 100 WBC
PLATELET # BLD AUTO: 392 K/UL
PMV BLD AUTO: 7.7 FL (ref 6.5–11.5)
POTASSIUM SERPL-SCNC: 3.4 MMOL/L (ref 3.5–5.1)
PROT SERPL-MCNC: 6.3 G/DL (ref 6.4–8.2)
RBC # BLD AUTO: 3.93 M/UL (ref 4.5–5.9)
SODIUM SERPL-SCNC: 139 MMOL/L (ref 136–145)
WBC # BLD AUTO: 13.2 K/UL (ref 4.4–11.3)

## 2020-09-16 PROCEDURE — 74011250637 HC RX REV CODE- 250/637: Performed by: FAMILY MEDICINE

## 2020-09-16 PROCEDURE — 74011250636 HC RX REV CODE- 250/636: Performed by: INTERNAL MEDICINE

## 2020-09-16 PROCEDURE — 36415 COLL VENOUS BLD VENIPUNCTURE: CPT

## 2020-09-16 PROCEDURE — 83735 ASSAY OF MAGNESIUM: CPT

## 2020-09-16 PROCEDURE — 74011000258 HC RX REV CODE- 258: Performed by: INTERNAL MEDICINE

## 2020-09-16 PROCEDURE — 74011250637 HC RX REV CODE- 250/637: Performed by: INTERNAL MEDICINE

## 2020-09-16 PROCEDURE — 65660000000 HC RM CCU STEPDOWN

## 2020-09-16 PROCEDURE — 85025 COMPLETE CBC W/AUTO DIFF WBC: CPT

## 2020-09-16 PROCEDURE — 65270000029 HC RM PRIVATE

## 2020-09-16 PROCEDURE — 80076 HEPATIC FUNCTION PANEL: CPT

## 2020-09-16 PROCEDURE — 80048 BASIC METABOLIC PNL TOTAL CA: CPT

## 2020-09-16 RX ADMIN — TAMSULOSIN HYDROCHLORIDE 0.4 MG: 0.4 CAPSULE ORAL at 09:19

## 2020-09-16 RX ADMIN — MULTIVITAMIN TABLET 1 TABLET: TABLET at 09:19

## 2020-09-16 RX ADMIN — CYCLOBENZAPRINE 5 MG: 10 TABLET, FILM COATED ORAL at 10:20

## 2020-09-16 RX ADMIN — SODIUM CHLORIDE 150 ML/HR: 9 INJECTION, SOLUTION INTRAVENOUS at 00:58

## 2020-09-16 RX ADMIN — Medication 10 ML: at 22:05

## 2020-09-16 RX ADMIN — FAMOTIDINE 20 MG: 20 TABLET, FILM COATED ORAL at 22:02

## 2020-09-16 RX ADMIN — FAMOTIDINE 20 MG: 20 TABLET, FILM COATED ORAL at 09:19

## 2020-09-16 RX ADMIN — CYCLOBENZAPRINE 5 MG: 10 TABLET, FILM COATED ORAL at 17:28

## 2020-09-16 RX ADMIN — ASPIRIN 81 MG 81 MG: 81 TABLET ORAL at 09:19

## 2020-09-16 RX ADMIN — SODIUM CHLORIDE 150 ML/HR: 9 INJECTION, SOLUTION INTRAVENOUS at 09:19

## 2020-09-16 RX ADMIN — Medication 10 ML: at 05:32

## 2020-09-16 RX ADMIN — VITAMIN D, TAB 1000IU (100/BT) 0.5 TABLET: 25 TAB at 09:18

## 2020-09-16 RX ADMIN — CEFEPIME 2 G: 2 INJECTION, POWDER, FOR SOLUTION INTRAVENOUS at 00:58

## 2020-09-16 RX ADMIN — CEFEPIME 2 G: 2 INJECTION, POWDER, FOR SOLUTION INTRAVENOUS at 17:13

## 2020-09-16 RX ADMIN — CEFEPIME 2 G: 2 INJECTION, POWDER, FOR SOLUTION INTRAVENOUS at 09:19

## 2020-09-16 RX ADMIN — POTASSIUM CHLORIDE 20 MEQ: 750 TABLET, FILM COATED, EXTENDED RELEASE ORAL at 17:28

## 2020-09-16 RX ADMIN — CLONAZEPAM 0.25 MG: 0.5 TABLET ORAL at 22:02

## 2020-09-16 RX ADMIN — POTASSIUM CHLORIDE 20 MEQ: 750 TABLET, FILM COATED, EXTENDED RELEASE ORAL at 09:19

## 2020-09-16 RX ADMIN — SODIUM CHLORIDE 150 ML/HR: 9 INJECTION, SOLUTION INTRAVENOUS at 18:26

## 2020-09-16 RX ADMIN — ENOXAPARIN SODIUM 40 MG: 40 INJECTION SUBCUTANEOUS at 09:19

## 2020-09-16 RX ADMIN — CLONAZEPAM 0.25 MG: 0.5 TABLET ORAL at 09:18

## 2020-09-16 RX ADMIN — LEVOFLOXACIN 750 MG: 5 INJECTION, SOLUTION INTRAVENOUS at 22:03

## 2020-09-16 RX ADMIN — GABAPENTIN 300 MG: 300 CAPSULE ORAL at 05:32

## 2020-09-16 RX ADMIN — GABAPENTIN 300 MG: 300 CAPSULE ORAL at 22:02

## 2020-09-16 RX ADMIN — GABAPENTIN 300 MG: 300 CAPSULE ORAL at 13:16

## 2020-09-16 RX ADMIN — THIAMINE HCL TAB 100 MG 100 MG: 100 TAB at 09:19

## 2020-09-16 NOTE — PROGRESS NOTES
Hospitalist Progress Note           Daily Progress Note: 9/16/2020    Chief complaint: Left upper extremity pain  Subjective: The patient is seen for follow  up. Notes increasing swelling to left upper extremity. No reported fevers or chills. No cough or productive sputum.   Tolerating IV antibiotics    Problem List:  Problem List as of 9/16/2020 Date Reviewed: 9/12/2020          Codes Class Noted - Resolved    * (Principal) Sepsis ([facility] ICD-10-CM: A41.9  ICD-9-CM: 038.9, 995.91  9/12/2020 - Present        UTI (urinary tract infection) ICD-10-CM: N39.0  ICD-9-CM: 599.0  9/12/2020 - Present        Anxiety ICD-10-CM: F41.9  ICD-9-CM: 300.00  7/20/2020 - Present        Spinal cord abscess ICD-10-CM: G06.1  ICD-9-CM: 324.1  4/8/2020 - Present        Alcoholism ([facility] ICD-10-CM: F10.20  ICD-9-CM: 303.90  3/17/2020 - Present        Tobacco abuse ICD-10-CM: Z72.0  ICD-9-CM: 305.1  3/17/2020 - Present              Medications reviewed  Current Facility-Administered Medications   Medication Dose Route Frequency    potassium chloride SR (KLOR-CON 10) tablet 20 mEq  20 mEq Oral BID    clonazePAM (KlonoPIN) tablet 0.25 mg  0.25 mg Oral Q12H    aspirin chewable tablet 81 mg  81 mg Oral DAILY    cholecalciferol (VITAMIN D3) (1000 Units /25 mcg) tablet 0.5 Tab  500 Units Oral DAILY    cyclobenzaprine (FLEXERIL) tablet 5 mg  5 mg Oral Q8H PRN    famotidine (PEPCID) tablet 20 mg  20 mg Oral Q12H    gabapentin (NEURONTIN) capsule 300 mg  300 mg Oral Q8H    multivitamin (ONE A DAY) tablet 1 Tab  1 Tab Oral DAILY    tamsulosin (FLOMAX) capsule 0.4 mg  0.4 mg Oral DAILY    thiamine mononitrate (B-1) tablet 100 mg  100 mg Oral DAILY    sodium chloride (NS) flush 5-40 mL  5-40 mL IntraVENous Q8H    sodium chloride (NS) flush 5-40 mL  5-40 mL IntraVENous PRN    polyethylene glycol (MIRALAX) packet 17 g  17 g Oral DAILY PRN    promethazine (PHENERGAN) tablet 12.5 mg  12.5 mg Oral Q6H PRN    Or  ondansetron (ZOFRAN) injection 4 mg  4 mg IntraVENous Q6H PRN    enoxaparin (LOVENOX) injection 40 mg  40 mg SubCUTAneous DAILY    0.9% sodium chloride infusion  150 mL/hr IntraVENous CONTINUOUS    cefepime (MAXIPIME) 2 g in 0.9% sodium chloride (MBP/ADV) 100 mL MBP  2 g IntraVENous Q8H    levoFLOXacin (LEVAQUIN) 750 mg in D5W IVPB  750 mg IntraVENous Q24H       Review of Systems:   Review of systems negative except for left upper extremity swelling and pain    Objective:   Physical Exam:     Visit Vitals  /87 (BP Patient Position: At rest)   Pulse 87   Temp 98.2 °F (36.8 °C)   Resp 18   Ht 6' (1.829 m)   Wt 58.1 kg (128 lb 1.6 oz)   SpO2 98%   BMI 17.37 kg/m²      O2 Device: Room air    Temp (24hrs), Av.5 °F (36.9 °C), Min:97.3 °F (36.3 °C), Max:99.4 °F (37.4 °C)    No intake/output data recorded.  1901 -  0700  In: -   Out: 6209 [Urine:3050]    General:  Alert, cooperative, no distress, appears stated age. Lungs:   Clear to auscultation bilaterally. Chest wall:  No tenderness or deformity. Heart:  Regular rate and rhythm, S1, S2 normal, no murmur, click, rub or gallop. Abdomen:   Soft, non-tender. Bowel sounds normal. No masses,  No organomegaly. Extremities: Extremities normal, atraumatic, no cyanosis or edema. Pulses: 2+ and symmetric all extremities. Skin: Skin color, texture, turgor normal. No rashes or lesions   Neurologic: CNII-XII intact.  No gross sensory or motor deficits     Data Review:       Recent Days:  Recent Labs     20  0559 09/15/20  0616 20  0520   WBC 13.2* 13.6* 12.8*   HGB 11.1* 11.4* 10.7*   HCT 33.1* 33.3* 31.3*    350 341     Recent Labs     20  0559 09/15/20  0616 20  0520 20  1135    139 137  --    K 3.4* 3.4* 3.0*  --     106 103  --    CO2 24 26 26  --    GLU 99 99 106*  --    BUN 10 13 18  --    CREA 0.61* 0.61* 0.83  --    CA 9.7 10.0 9.9  --    MG 1.3*  --   --  1.2*   ALB 1.9*  --   --   -- TBILI 0.4  --   --   --    ALT 27  --   --   --      No results for input(s): PH, PCO2, PO2, HCO3, FIO2 in the last 72 hours. 24 Hour Results:  Recent Results (from the past 24 hour(s))   METABOLIC PANEL, BASIC    Collection Time: 09/16/20  5:59 AM   Result Value Ref Range    Sodium 139 136 - 145 mmol/L    Potassium 3.4 (L) 3.5 - 5.1 mmol/L    Chloride 105 97 - 108 mmol/L    CO2 24 21 - 32 mmol/L    Anion gap 10 5 - 15 mmol/L    Glucose 99 65 - 100 mg/dL    BUN 10 6 - 20 mg/dL    Creatinine 0.61 (L) 0.70 - 1.30 mg/dL    BUN/Creatinine ratio 16 12 - 20      GFR est AA >60 >60 ml/min/1.73m2    GFR est non-AA >60 >60 ml/min/1.73m2    Calcium 9.7 8.5 - 10.1 mg/dL   CBC WITH AUTOMATED DIFF    Collection Time: 09/16/20  5:59 AM   Result Value Ref Range    WBC 13.2 (H) 4.4 - 11.3 K/uL    RBC 3.93 (L) 4.50 - 5.90 M/uL    HGB 11.1 (L) 13.5 - 17.5 %    HCT 33.1 (L) 41 - 53 %    MCV 84.2 80 - 100 FL    MCH 28.4 (L) 31 - 34 PG    MCHC 33.7 31.0 - 36.0 g/dL    RDW 13.8 11.5 - 14.5 %    PLATELET 153 K/uL    MPV 7.7 6.5 - 11.5 FL    NRBC 0.0  WBC    ABSOLUTE NRBC 0.01 K/uL    NEUTROPHILS 79 (H) 42 - 75 %    LYMPHOCYTES 10 (L) 20.5 - 51.1 %    MONOCYTES 7 1.7 - 9.3 %    EOSINOPHILS 3 (H) 0.9 - 2.9 %    BASOPHILS 1 0.0 - 2.5 %    ABS. NEUTROPHILS 10.5 (H) 1.8 - 7.7 K/UL    ABS. LYMPHOCYTES 1.3 1.0 - 4.8 K/UL    ABS. MONOCYTES 0.9 0.2 - 2.4 K/UL    ABS. EOSINOPHILS 0.4 0.0 - 0.7 K/UL    ABS. BASOPHILS 0.1 0.0 - 0.2 K/UL   MAGNESIUM    Collection Time: 09/16/20  5:59 AM   Result Value Ref Range    Magnesium 1.3 (L) 1.6 - 2.4 mg/dL   HEPATIC FUNCTION PANEL    Collection Time: 09/16/20  5:59 AM   Result Value Ref Range    Protein, total 6.3 (L) 6.4 - 8.2 g/dL    Albumin 1.9 (L) 3.5 - 5.0 g/dL    Globulin 4.4 (H) 2.0 - 4.0 g/dL    A-G Ratio 0.4 (L) 1.1 - 2.2      Bilirubin, total 0.4 0.2 - 1.0 mg/dL    Bilirubin, direct 0.1 0.0 - 0.2 mg/dL    Alk.  phosphatase 52 45 - 117 U/L    AST (SGOT) 16 15 - 37 U/L    ALT (SGPT) 27 12 - 78 U/L           Assessment/     Sepsis  -Patient presents with fever, tachycardia and leukocytosis meeting sepsis criteria  -Likely source is urinary tract infection, given patient is a resident of long-term care facility and chest x-ray showing infiltrate in the lung base  -Follow cultures, COVID result pending     Urinary tract infection  -Urinalysis shows pyuria, urine culture gram-negative rods  -Continue current antibiotics with cefepime and Levaquin     Anemia  -May be hemo-delusional  Continue monitor H&H-     Electrolyte abnormalities  -Patient with hypomagnesemia and hypokalemia  -Replacing  -Follow electrolytes     Anxiety  -Continue lorazepam     Decubitus ulcer  -Stage IV sacral decubitus ulcer  -Wound management     Code status: FULL  DVT prophylaxis: Lovenox       Plan:  Continue supportive care including IV antibiotics  Follow-up COVID-19 test results  Anticipate discharge to skilled care facility in 1 to 2 days      Care Plan discussed with: Patient/Family    Total time spent with patient: 30 minutes.     Cindy Retana MD

## 2020-09-17 LAB
ANION GAP SERPL CALC-SCNC: 7 MMOL/L (ref 5–15)
BASOPHILS # BLD: 0.1 K/UL (ref 0–0.2)
BASOPHILS NFR BLD: 1 % (ref 0–2.5)
BUN SERPL-MCNC: 6 MG/DL (ref 6–20)
BUN/CREAT SERPL: 10 (ref 12–20)
CA-I BLD-MCNC: 9.5 MG/DL (ref 8.5–10.1)
CHLORIDE SERPL-SCNC: 107 MMOL/L (ref 97–108)
CO2 SERPL-SCNC: 26 MMOL/L (ref 21–32)
CREAT SERPL-MCNC: 0.59 MG/DL (ref 0.7–1.3)
EOSINOPHIL # BLD: 0.5 K/UL (ref 0–0.7)
EOSINOPHIL NFR BLD: 4 % (ref 0.9–2.9)
ERYTHROCYTE [DISTWIDTH] IN BLOOD BY AUTOMATED COUNT: 13.9 % (ref 11.5–14.5)
GLUCOSE SERPL-MCNC: 139 MG/DL (ref 65–100)
HCT VFR BLD AUTO: 33.6 % (ref 41–53)
HGB BLD-MCNC: 11.4 % (ref 13.5–17.5)
LYMPHOCYTES # BLD: 1.5 K/UL (ref 1–4.8)
LYMPHOCYTES NFR BLD: 11 % (ref 20.5–51.1)
MCH RBC QN AUTO: 28.6 PG (ref 31–34)
MCHC RBC AUTO-ENTMCNC: 33.8 G/DL (ref 31–36)
MCV RBC AUTO: 84.6 FL (ref 80–100)
MONOCYTES # BLD: 0.9 K/UL (ref 0.2–2.4)
MONOCYTES NFR BLD: 6 % (ref 1.7–9.3)
NEUTS SEG # BLD: 10.8 K/UL (ref 1.8–7.7)
NEUTS SEG NFR BLD: 78 % (ref 42–75)
NRBC # BLD: 0.01 K/UL
NRBC BLD-RTO: 0 PER 100 WBC
PLATELET # BLD AUTO: 409 K/UL
PMV BLD AUTO: 8.3 FL (ref 6.5–11.5)
POTASSIUM SERPL-SCNC: 3.2 MMOL/L (ref 3.5–5.1)
RBC # BLD AUTO: 3.97 M/UL (ref 4.5–5.9)
SARS-COV-2, COV2NT: NOT DETECTED
SODIUM SERPL-SCNC: 140 MMOL/L (ref 136–145)
WBC # BLD AUTO: 13.8 K/UL (ref 4.4–11.3)

## 2020-09-17 PROCEDURE — 74011000258 HC RX REV CODE- 258: Performed by: INTERNAL MEDICINE

## 2020-09-17 PROCEDURE — 74011250636 HC RX REV CODE- 250/636: Performed by: INTERNAL MEDICINE

## 2020-09-17 PROCEDURE — 74011250637 HC RX REV CODE- 250/637: Performed by: FAMILY MEDICINE

## 2020-09-17 PROCEDURE — 65270000029 HC RM PRIVATE

## 2020-09-17 PROCEDURE — 74011250637 HC RX REV CODE- 250/637: Performed by: INTERNAL MEDICINE

## 2020-09-17 PROCEDURE — 65660000000 HC RM CCU STEPDOWN

## 2020-09-17 PROCEDURE — 85025 COMPLETE CBC W/AUTO DIFF WBC: CPT

## 2020-09-17 PROCEDURE — 36415 COLL VENOUS BLD VENIPUNCTURE: CPT

## 2020-09-17 PROCEDURE — 80048 BASIC METABOLIC PNL TOTAL CA: CPT

## 2020-09-17 RX ORDER — NYSTATIN 100000 [USP'U]/ML
500000 SUSPENSION ORAL 4 TIMES DAILY
Status: DISCONTINUED | OUTPATIENT
Start: 2020-09-17 | End: 2020-09-18 | Stop reason: HOSPADM

## 2020-09-17 RX ORDER — FLUCONAZOLE 100 MG/1
100 TABLET ORAL DAILY
Status: DISCONTINUED | OUTPATIENT
Start: 2020-09-17 | End: 2020-09-18 | Stop reason: HOSPADM

## 2020-09-17 RX ADMIN — Medication 10 ML: at 05:31

## 2020-09-17 RX ADMIN — NYSTATIN 500000 UNITS: 100000 SUSPENSION ORAL at 09:07

## 2020-09-17 RX ADMIN — POTASSIUM CHLORIDE 20 MEQ: 750 TABLET, FILM COATED, EXTENDED RELEASE ORAL at 09:22

## 2020-09-17 RX ADMIN — PIPERACILLIN AND TAZOBACTAM 3.38 G: 3; .375 INJECTION, POWDER, LYOPHILIZED, FOR SOLUTION INTRAVENOUS at 20:19

## 2020-09-17 RX ADMIN — PIPERACILLIN AND TAZOBACTAM 3.38 G: 3; .375 INJECTION, POWDER, LYOPHILIZED, FOR SOLUTION INTRAVENOUS at 13:47

## 2020-09-17 RX ADMIN — TAMSULOSIN HYDROCHLORIDE 0.4 MG: 0.4 CAPSULE ORAL at 09:04

## 2020-09-17 RX ADMIN — SODIUM CHLORIDE 150 ML/HR: 9 INJECTION, SOLUTION INTRAVENOUS at 03:13

## 2020-09-17 RX ADMIN — FAMOTIDINE 20 MG: 20 TABLET, FILM COATED ORAL at 09:03

## 2020-09-17 RX ADMIN — THIAMINE HCL TAB 100 MG 100 MG: 100 TAB at 09:04

## 2020-09-17 RX ADMIN — ASPIRIN 81 MG 81 MG: 81 TABLET ORAL at 09:03

## 2020-09-17 RX ADMIN — FAMOTIDINE 20 MG: 20 TABLET, FILM COATED ORAL at 21:30

## 2020-09-17 RX ADMIN — SODIUM CHLORIDE 150 ML/HR: 9 INJECTION, SOLUTION INTRAVENOUS at 09:24

## 2020-09-17 RX ADMIN — FLUCONAZOLE 100 MG: 100 TABLET ORAL at 09:22

## 2020-09-17 RX ADMIN — CEFEPIME 2 G: 2 INJECTION, POWDER, FOR SOLUTION INTRAVENOUS at 04:21

## 2020-09-17 RX ADMIN — VITAMIN D, TAB 1000IU (100/BT) 0.5 TABLET: 25 TAB at 09:04

## 2020-09-17 RX ADMIN — Medication 5 ML: at 09:26

## 2020-09-17 RX ADMIN — ENOXAPARIN SODIUM 40 MG: 40 INJECTION SUBCUTANEOUS at 09:05

## 2020-09-17 RX ADMIN — POTASSIUM CHLORIDE 20 MEQ: 750 TABLET, FILM COATED, EXTENDED RELEASE ORAL at 17:07

## 2020-09-17 RX ADMIN — CLONAZEPAM 0.25 MG: 0.5 TABLET ORAL at 21:31

## 2020-09-17 RX ADMIN — NYSTATIN 500000 UNITS: 100000 SUSPENSION ORAL at 13:18

## 2020-09-17 RX ADMIN — Medication 10 ML: at 22:00

## 2020-09-17 RX ADMIN — MULTIVITAMIN TABLET 1 TABLET: TABLET at 09:03

## 2020-09-17 RX ADMIN — CLONAZEPAM 0.25 MG: 0.5 TABLET ORAL at 09:03

## 2020-09-17 RX ADMIN — LEVOFLOXACIN 750 MG: 5 INJECTION, SOLUTION INTRAVENOUS at 22:15

## 2020-09-17 RX ADMIN — NYSTATIN 500000 UNITS: 100000 SUSPENSION ORAL at 17:08

## 2020-09-17 RX ADMIN — PIPERACILLIN AND TAZOBACTAM 3.38 G: 3; .375 INJECTION, POWDER, LYOPHILIZED, FOR SOLUTION INTRAVENOUS at 09:23

## 2020-09-17 RX ADMIN — NYSTATIN 500000 UNITS: 100000 SUSPENSION ORAL at 21:29

## 2020-09-17 RX ADMIN — GABAPENTIN 300 MG: 300 CAPSULE ORAL at 13:18

## 2020-09-17 RX ADMIN — LEVOFLOXACIN 750 MG: 5 INJECTION, SOLUTION INTRAVENOUS at 22:11

## 2020-09-17 RX ADMIN — GABAPENTIN 300 MG: 300 CAPSULE ORAL at 05:30

## 2020-09-17 RX ADMIN — GABAPENTIN 300 MG: 300 CAPSULE ORAL at 21:31

## 2020-09-17 RX ADMIN — Medication 10 ML: at 22:13

## 2020-09-17 NOTE — PROGRESS NOTES
Hospitalist Progress Note           Daily Progress Note: 9/17/2020      Subjective: The patient is seen for follow  up. Reports mouth sores and difficulty swallowing. No fevers or chills.   WBC count remains high    Problem List:  Problem List as of 9/17/2020 Date Reviewed: 9/12/2020          Codes Class Noted - Resolved    * (Principal) Sepsis ([facility] ICD-10-CM: A41.9  ICD-9-CM: 038.9, 995.91  9/12/2020 - Present        UTI (urinary tract infection) ICD-10-CM: N39.0  ICD-9-CM: 599.0  9/12/2020 - Present        Anxiety ICD-10-CM: F41.9  ICD-9-CM: 300.00  7/20/2020 - Present        Spinal cord abscess ICD-10-CM: G06.1  ICD-9-CM: 324.1  4/8/2020 - Present        Alcoholism ([facility] ICD-10-CM: F10.20  ICD-9-CM: 303.90  3/17/2020 - Present        Tobacco abuse ICD-10-CM: Z72.0  ICD-9-CM: 305.1  3/17/2020 - Present              Medications reviewed  Current Facility-Administered Medications   Medication Dose Route Frequency    piperacillin-tazobactam (ZOSYN) 3.375 g in 0.9% sodium chloride (MBP/ADV) 100 mL MBP  3.375 g IntraVENous Q6H    nystatin (MYCOSTATIN) 100,000 unit/mL oral suspension 500,000 Units  500,000 Units Oral QID    fluconazole (DIFLUCAN) tablet 100 mg  100 mg Oral DAILY    benzocaine-menthoL (CEPACOL) lozenge 1 Lozenge  1 Lozenge Mucous Membrane PRN    potassium chloride SR (KLOR-CON 10) tablet 20 mEq  20 mEq Oral BID    clonazePAM (KlonoPIN) tablet 0.25 mg  0.25 mg Oral Q12H    aspirin chewable tablet 81 mg  81 mg Oral DAILY    cholecalciferol (VITAMIN D3) (1000 Units /25 mcg) tablet 0.5 Tab  500 Units Oral DAILY    cyclobenzaprine (FLEXERIL) tablet 5 mg  5 mg Oral Q8H PRN    famotidine (PEPCID) tablet 20 mg  20 mg Oral Q12H    gabapentin (NEURONTIN) capsule 300 mg  300 mg Oral Q8H    multivitamin (ONE A DAY) tablet 1 Tab  1 Tab Oral DAILY    tamsulosin (FLOMAX) capsule 0.4 mg  0.4 mg Oral DAILY    thiamine mononitrate (B-1) tablet 100 mg  100 mg Oral DAILY    sodium chloride (NS) flush 5-40 mL  5-40 mL IntraVENous Q8H    sodium chloride (NS) flush 5-40 mL  5-40 mL IntraVENous PRN    polyethylene glycol (MIRALAX) packet 17 g  17 g Oral DAILY PRN    promethazine (PHENERGAN) tablet 12.5 mg  12.5 mg Oral Q6H PRN    Or    ondansetron (ZOFRAN) injection 4 mg  4 mg IntraVENous Q6H PRN    enoxaparin (LOVENOX) injection 40 mg  40 mg SubCUTAneous DAILY    0.9% sodium chloride infusion  150 mL/hr IntraVENous CONTINUOUS    levoFLOXacin (LEVAQUIN) 750 mg in D5W IVPB  750 mg IntraVENous Q24H       Review of Systems:   A comprehensive review of systems was negative except for: Ears, nose, mouth, throat, and face: positive for sore throat    Objective:   Physical Exam:     Visit Vitals  /84   Pulse 88   Temp 98.7 °F (37.1 °C)   Resp 20   Ht 6' (1.829 m)   Wt 58.1 kg (128 lb 1.6 oz)   SpO2 94%   BMI 17.37 kg/m²      O2 Device: Room air    Temp (24hrs), Av.6 °F (37.6 °C), Min:98.2 °F (36.8 °C), Max:100.8 °F (38.2 °C)    No intake/output data recorded. 09/15 1901 -  0700  In: -   Out: 2900 [Urine:2900]    General:  Alert, cooperative, no distress, appears stated age. Lungs:   Clear to auscultation bilaterally. Chest wall:  No tenderness or deformity. Heart:  Regular rate and rhythm, S1, S2 normal, no murmur, click, rub or gallop. Abdomen:   Soft, non-tender. Bowel sounds normal. No masses,  No organomegaly. Extremities: Extremities normal, atraumatic, no cyanosis or edema. Pulses: 2+ and symmetric all extremities. Skin: Skin color, texture, turgor normal. No rashes or lesions   Neurologic: CNII-XII intact.  No gross sensory or motor deficits     Data Review:       Recent Days:  Recent Labs     20  0559 09/15/20  0616   WBC 13.2* 13.6*   HGB 11.1* 11.4*   HCT 33.1* 33.3*    350     Recent Labs     20  0559 09/15/20  0616    139   K 3.4* 3.4*    106   CO2 24 26   GLU 99 99   BUN 10 13   CREA 0.61* 0.61*   CA 9.7 10.0   MG 1.3*  --    ALB 1.9*  --    TBILI 0.4  --    ALT 27  --      No results for input(s): PH, PCO2, PO2, HCO3, FIO2 in the last 72 hours. 24 Hour Results:  No results found for this or any previous visit (from the past 24 hour(s)). Assessment/     Sepsis  -Patient presents with fever, tachycardia and leukocytosis meeting sepsis criteria  -Likely source is urinary tract infection, given patient is a resident of long-term care facility and chest x-ray showing infiltrate in the lung base  -COVID negative     Urinary tract infection  -Urinalysis shows pyuria, urine culture gram-negative rods  -Continue current antibiotics with cefepime and Levaquin     Anemia  -May be hemo-delusional  Continue monitor H&H-     Electrolyte abnormalities  -Patient with hypomagnesemia and hypokalemia  -Replacing  -Follow electrolytes     Anxiety  -Continue lorazepam     Decubitus ulcer  -Stage IV sacral decubitus ulcer  -Wound management     Code status: FULL  DVT prophylaxis: Lovenox             Plan:  Continue supportive care including IV antibiotics  We will broaden IV antibiotic coverage and discontinue cefepime  And fluconazole for oral thrush  Anticipate discharge to skilled care facility in 1 to 2 days      Care Plan discussed with: Patient/Family    Total time spent with patient: 30 minutes.     Johnny Angel MD

## 2020-09-18 VITALS
WEIGHT: 128.1 LBS | RESPIRATION RATE: 18 BRPM | HEART RATE: 68 BPM | BODY MASS INDEX: 17.35 KG/M2 | DIASTOLIC BLOOD PRESSURE: 85 MMHG | OXYGEN SATURATION: 95 % | TEMPERATURE: 98 F | SYSTOLIC BLOOD PRESSURE: 128 MMHG | HEIGHT: 72 IN

## 2020-09-18 LAB
ANION GAP SERPL CALC-SCNC: 5 MMOL/L (ref 5–15)
BASOPHILS # BLD: 0.1 K/UL (ref 0–0.2)
BASOPHILS NFR BLD: 1 % (ref 0–2.5)
BUN SERPL-MCNC: 4 MG/DL (ref 6–20)
BUN/CREAT SERPL: 6 (ref 12–20)
CA-I BLD-MCNC: 9.2 MG/DL (ref 8.5–10.1)
CHLORIDE SERPL-SCNC: 106 MMOL/L (ref 97–108)
CO2 SERPL-SCNC: 29 MMOL/L (ref 21–32)
COLONY COUNT,CNT: ABNORMAL
CREAT SERPL-MCNC: 0.62 MG/DL (ref 0.7–1.3)
CULTURE,CULT: ABNORMAL
CULTURE,CULT: ABNORMAL
CULTURE,CULT: NORMAL
CULTURE,CULT: NORMAL
EOSINOPHIL # BLD: 0.7 K/UL (ref 0–0.7)
EOSINOPHIL NFR BLD: 5 % (ref 0.9–2.9)
ERYTHROCYTE [DISTWIDTH] IN BLOOD BY AUTOMATED COUNT: 13.7 % (ref 11.5–14.5)
GLUCOSE SERPL-MCNC: 99 MG/DL (ref 65–100)
HCT VFR BLD AUTO: 32.3 % (ref 41–53)
HGB BLD-MCNC: 11 % (ref 13.5–17.5)
LYMPHOCYTES # BLD: 1.4 K/UL (ref 1–4.8)
LYMPHOCYTES NFR BLD: 10 % (ref 20.5–51.1)
MCH RBC QN AUTO: 28.5 PG (ref 31–34)
MCHC RBC AUTO-ENTMCNC: 33.9 G/DL (ref 31–36)
MCV RBC AUTO: 84.2 FL (ref 80–100)
MONOCYTES # BLD: 0.8 K/UL (ref 0.2–2.4)
MONOCYTES NFR BLD: 6 % (ref 1.7–9.3)
NEUTS SEG # BLD: 10.9 K/UL (ref 1.8–7.7)
NEUTS SEG NFR BLD: 78 % (ref 42–75)
NRBC # BLD: 0.01 K/UL
NRBC BLD-RTO: 0 PER 100 WBC
PLATELET # BLD AUTO: 438 K/UL
PMV BLD AUTO: 7.2 FL (ref 6.5–11.5)
POTASSIUM SERPL-SCNC: 3.4 MMOL/L (ref 3.5–5.1)
RBC # BLD AUTO: 3.84 M/UL (ref 4.5–5.9)
SODIUM SERPL-SCNC: 140 MMOL/L (ref 136–145)
SPECIAL REQUESTS,SREQ: ABNORMAL
SPECIAL REQUESTS,SREQ: NORMAL
SPECIAL REQUESTS,SREQ: NORMAL
WBC # BLD AUTO: 13.7 K/UL (ref 4.4–11.3)

## 2020-09-18 PROCEDURE — 74011250636 HC RX REV CODE- 250/636: Performed by: INTERNAL MEDICINE

## 2020-09-18 PROCEDURE — 74011000258 HC RX REV CODE- 258: Performed by: INTERNAL MEDICINE

## 2020-09-18 PROCEDURE — 74011250637 HC RX REV CODE- 250/637: Performed by: FAMILY MEDICINE

## 2020-09-18 PROCEDURE — 74011250637 HC RX REV CODE- 250/637: Performed by: INTERNAL MEDICINE

## 2020-09-18 PROCEDURE — 85025 COMPLETE CBC W/AUTO DIFF WBC: CPT

## 2020-09-18 PROCEDURE — 36415 COLL VENOUS BLD VENIPUNCTURE: CPT

## 2020-09-18 PROCEDURE — 80048 BASIC METABOLIC PNL TOTAL CA: CPT

## 2020-09-18 RX ORDER — NYSTATIN 100000 [USP'U]/ML
500000 SUSPENSION ORAL 4 TIMES DAILY
Qty: 140 ML | Refills: 0 | Status: SHIPPED | OUTPATIENT
Start: 2020-09-18 | End: 2020-09-25

## 2020-09-18 RX ORDER — FLUCONAZOLE 100 MG/1
100 TABLET ORAL DAILY
Qty: 7 TAB | Refills: 0 | Status: SHIPPED | OUTPATIENT
Start: 2020-09-18 | End: 2020-09-25

## 2020-09-18 RX ORDER — LEVOFLOXACIN 750 MG/1
750 TABLET ORAL DAILY
Qty: 7 TAB | Refills: 0 | Status: SHIPPED | OUTPATIENT
Start: 2020-09-18 | End: 2020-09-25

## 2020-09-18 RX ORDER — POTASSIUM CHLORIDE 1500 MG/1
20 TABLET, FILM COATED, EXTENDED RELEASE ORAL 2 TIMES DAILY
Qty: 10 TAB | Refills: 0 | Status: SHIPPED | OUTPATIENT
Start: 2020-09-18 | End: 2020-09-23

## 2020-09-18 RX ORDER — AMPICILLIN 500 MG/1
500 CAPSULE ORAL
Qty: 28 CAP | Refills: 0 | Status: SHIPPED | OUTPATIENT
Start: 2020-09-18 | End: 2020-09-25

## 2020-09-18 RX ADMIN — NYSTATIN 500000 UNITS: 100000 SUSPENSION ORAL at 10:33

## 2020-09-18 RX ADMIN — Medication 10 ML: at 06:00

## 2020-09-18 RX ADMIN — GABAPENTIN 300 MG: 300 CAPSULE ORAL at 10:58

## 2020-09-18 RX ADMIN — POTASSIUM CHLORIDE 20 MEQ: 750 TABLET, FILM COATED, EXTENDED RELEASE ORAL at 10:31

## 2020-09-18 RX ADMIN — TAMSULOSIN HYDROCHLORIDE 0.4 MG: 0.4 CAPSULE ORAL at 10:30

## 2020-09-18 RX ADMIN — PIPERACILLIN AND TAZOBACTAM 3.38 G: 3; .375 INJECTION, POWDER, LYOPHILIZED, FOR SOLUTION INTRAVENOUS at 01:57

## 2020-09-18 RX ADMIN — VITAMIN D, TAB 1000IU (100/BT) 0.5 TABLET: 25 TAB at 10:30

## 2020-09-18 RX ADMIN — PIPERACILLIN AND TAZOBACTAM 3.38 G: 3; .375 INJECTION, POWDER, LYOPHILIZED, FOR SOLUTION INTRAVENOUS at 10:29

## 2020-09-18 RX ADMIN — THIAMINE HCL TAB 100 MG 100 MG: 100 TAB at 10:30

## 2020-09-18 RX ADMIN — MULTIVITAMIN TABLET 1 TABLET: TABLET at 10:30

## 2020-09-18 RX ADMIN — CLONAZEPAM 0.25 MG: 0.5 TABLET ORAL at 09:00

## 2020-09-18 RX ADMIN — ASPIRIN 81 MG 81 MG: 81 TABLET ORAL at 10:31

## 2020-09-18 RX ADMIN — FLUCONAZOLE 100 MG: 100 TABLET ORAL at 10:31

## 2020-09-18 RX ADMIN — GABAPENTIN 300 MG: 300 CAPSULE ORAL at 13:05

## 2020-09-18 RX ADMIN — NYSTATIN 500000 UNITS: 100000 SUSPENSION ORAL at 13:05

## 2020-09-18 RX ADMIN — FAMOTIDINE 20 MG: 20 TABLET, FILM COATED ORAL at 10:30

## 2020-09-18 RX ADMIN — GABAPENTIN 300 MG: 300 CAPSULE ORAL at 06:00

## 2020-09-18 NOTE — DISCHARGE SUMMARY
Physician Discharge Summary     Patient ID:    Marielle Newell  [id number]  2 y.o.  1965    Admit date: 9/12/2020    Discharge date : 9/18/2020    Chronic Diagnoses:    Problem List as of 9/18/2020 Date Reviewed: 9/12/2020          Codes Class Noted - Resolved    * (Principal) Sepsis (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 ICD-10-CM: F10.20  ICD-9-CM: 303.90  3/17/2020 - Present        Tobacco abuse ICD-10-CM: Z72.0  ICD-9-CM: 305.1  3/17/2020 - Present          22    Final Diagnoses:   Sepsis ([facility].) [A41.9]  UTI (urinary tract infection) [N39.0]   Decubitus ulcer  Paraplegia  Hypokalemia      Reason for Hospitalization: Feeling sick        Hospital Course: 69-year-old gentleman with history of paraplegia admitted for urinary tract infection. Urine cultures positive for Pseudomonas and enterococcus sensitive to ampicillin and Levaquin. He was treated with IV cefepime and Levaquin and later switched to IV Zosyn due to persistent fever. Fever has subsided upon change of antibiotic. Also noted with oral thrush and prescribed fluconazole. Overall clinically improved and deemed stable for discharge to home                Discharge Medications:   Current Discharge Medication List      START taking these medications    Details   fluconazole (DIFLUCAN) 100 mg tablet Take 1 Tab by mouth daily for 7 days. FDA advises cautious prescribing of oral fluconazole in pregnancy. Qty: 7 Tab, Refills: 0      nystatin (MYCOSTATIN) 100,000 unit/mL suspension Take 5 mL by mouth four (4) times daily for 7 days.  swish and spit  Qty: 140 mL, Refills: 0      potassium chloride SR (K-TAB) 20 mEq tablet Take 1 Tab by mouth two (2) times a day for 5 days. Qty: 10 Tab, Refills: 0      levoFLOXacin (Levaquin) 750 mg tablet Take 1 Tab by mouth daily for 7 days. Indications: bacterial infection of kidney due to E. coli organism  Qty: 7 Tab, Refills: 0      ampicillin (PRINCIPEN) 500 mg capsule Take 1 Cap by mouth Before breakfast, lunch, dinner and at bedtime for 7 days. Indications: infection of the urinary tract caused by Enterococcus  Qty: 28 Cap, Refills: 0         CONTINUE these medications which have NOT CHANGED    Details   acetaminophen (TYLENOL) 325 mg tablet Take 650 mg by mouth every four (4) hours as needed. aspirin 81 mg chewable tablet Take 81 mg by mouth daily. cholecalciferol (VITAMIN D3) (1000 Units /25 mcg) tablet Take 500 Units by mouth daily. clonazePAM (KlonoPIN) 0.5 mg tablet Take 0.25 mg by mouth two (2) times a day. cyclobenzaprine (FLEXERIL) 5 mg tablet Take 5 mg by mouth every eight (8) hours as needed. enoxaparin (LOVENOX) 40 mg/0.4 mL 40 mg by SubCUTAneous route. famotidine (PEPCID) 20 mg tablet Take 20 mg by mouth two (2) times a day.      gabapentin (NEURONTIN) 300 mg capsule Take 300 mg by mouth three (3) times daily. albuterol-ipratropium (DUO-NEB) 2.5 mg-0.5 mg/3 ml nebu Take 3 mL by inhalation every six (6) hours as needed. thiamine HCL (B-1) 100 mg tablet Take 200 mg by mouth daily. tamsulosin (FLOMAX) 0.4 mg capsule Take 0.4 mg by mouth.      multivit-min-iron-folic-vit K1 8 NF-762 mcg- 10 mcg chew Take 1 Tab by mouth daily. Follow up Care:    1. Rubi Kong MD in 1-2 weeks. Please call to set up an appointment shortly after discharge. Diet:  Regular Diet    Disposition:  Home. Advanced Directive:   FULL x   DNR      Discharge Exam:    General:  Alert, cooperative, no distress, appears stated age. Lungs:   Clear to auscultation bilaterally. Chest wall:  No tenderness or deformity.    Heart:  Regular rate and rhythm, S1, S2 normal, no murmur, click, rub or gallop. Abdomen:   Soft, non-tender. Bowel sounds normal. No masses,  No organomegaly. Extremities: Extremities normal, atraumatic, no cyanosis or edema. Pulses: 2+ and symmetric all extremities. Skin: Skin color, texture, turgor normal. No rashes or lesions   Neurologic: CNII-XII intact. No gross sensory or motor deficits       CONSULTATIONS: None    Significant Diagnostic Studies:   9/12/2020: BUN 30 mg/dL* (Ref range: 6 - 20 mg/dL); Calcium 10.8 mg/dL* (Ref range: 8.5 - 10.1 mg/dL); CO2 32 mmol/L (Ref range: 21 - 32 mmol/L); Creatinine 1.10 mg/dL (Ref range: 0.70 - 1.30 mg/dL); Glucose 126 mg/dL* (Ref range: 65 - 100 mg/dL); HCT 35.9 %* (Ref range: 41 - 53 %); HGB 12.2 %* (Ref range: 13.5 - 17.5 %); Potassium 3.5 mmol/L (Ref range: 3.5 - 5.1 mmol/L); Sodium 136 mmol/L (Ref range: 136 - 145 mmol/L)  9/13/2020: BUN 24 mg/dL* (Ref range: 6 - 20 mg/dL); Calcium 10.1 mg/dL (Ref range: 8.5 - 10.1 mg/dL); CO2 31 mmol/L (Ref range: 21 - 32 mmol/L); Creatinine 0.92 mg/dL (Ref range: 0.70 - 1.30 mg/dL); Glucose 131 mg/dL* (Ref range: 65 - 100 mg/dL); HCT 32.7 %* (Ref range: 41 - 53 %); HGB 11.2 %* (Ref range: 13.5 - 17.5 %); Potassium 3.7 mmol/L (Ref range: 3.5 - 5.1 mmol/L); Sodium 136 mmol/L (Ref range: 136 - 145 mmol/L)  Recent Labs     09/18/20  0801 09/17/20  0800   WBC 13.7* 13.8*   HGB 11.0* 11.4*   HCT 32.3* 33.6*    409     Recent Labs     09/18/20  0801 09/17/20  0800 09/16/20  0559    140 139   K 3.4* 3.2* 3.4*    107 105   CO2 29 26 24   BUN 4* 6 10   CREA 0.62* 0.59* 0.61*   GLU 99 139* 99   CA 9.2 9.5 9.7   MG  --   --  1.3*     Recent Labs     09/16/20  0559   ALT 27   AP 52   TBILI 0.4   TP 6.3*   ALB 1.9*   GLOB 4.4*     No results for input(s): INR, PTP, APTT, INREXT in the last 72 hours. No results for input(s): FE, TIBC, PSAT, FERR in the last 72 hours. No results for input(s): PH, PCO2, PO2 in the last 72 hours.   No results for input(s): CPK, CKMB in the last 72 hours.     No lab exists for component: TROPONINI  No results found for: [facility]    Time spent: 30 minutes    Signed:  Vito Christopher MD  9/18/2020  9:45 AM

## 2020-09-18 NOTE — ROUTINE PROCESS
Bedside shift change report given to Norma Madrid LPN (oncoming nurse) by Nadara Lanes, RN (offgoing nurse). Report included the following information SBAR.

## 2020-09-18 NOTE — PROGRESS NOTES
Patient to go to [place] [address], room 212.2, call report to [phone]. Auth for DC transportation obtained, nurse to call [phone] [facility] with time for pickup.    [employer] trans: [phone]  Auth for [employer]: [id number]

## 2021-01-14 ENCOUNTER — HOSPITAL ENCOUNTER (INPATIENT)
Age: 56
LOS: 2 days | Discharge: NURSING, REHAB OR LONG-TERM CARE | DRG: 466 | End: 2021-01-18
Attending: EMERGENCY MEDICINE | Admitting: HOSPITALIST
Payer: MEDICAID

## 2021-01-14 DIAGNOSIS — D72.820 LYMPHOCYTOSIS: ICD-10-CM

## 2021-01-14 DIAGNOSIS — T83.511A URINARY TRACT INFECTION ASSOCIATED WITH INDWELLING URETHRAL CATHETER, INITIAL ENCOUNTER (HCC): Primary | ICD-10-CM

## 2021-01-14 DIAGNOSIS — L89.159 PRESSURE INJURY OF SKIN OF SACRAL REGION, UNSPECIFIED INJURY STAGE: ICD-10-CM

## 2021-01-14 DIAGNOSIS — N39.0 URINARY TRACT INFECTION ASSOCIATED WITH INDWELLING URETHRAL CATHETER, INITIAL ENCOUNTER (HCC): Primary | ICD-10-CM

## 2021-01-14 LAB
ALBUMIN SERPL-MCNC: 3.1 G/DL (ref 3.5–5)
ALBUMIN/GLOB SERPL: 0.7 {RATIO} (ref 1.1–2.2)
ALP SERPL-CCNC: 102 U/L (ref 45–117)
ALT SERPL-CCNC: 53 U/L (ref 12–78)
AMORPH CRY URNS QL MICRO: ABNORMAL
ANION GAP SERPL CALC-SCNC: 7 MMOL/L (ref 5–15)
APPEARANCE UR: ABNORMAL
AST SERPL W P-5'-P-CCNC: 33 U/L (ref 15–37)
BACTERIA URNS QL MICRO: ABNORMAL /HPF
BASOPHILS # BLD: 0.1 K/UL (ref 0–0.2)
BASOPHILS NFR BLD: 0 % (ref 0–2.5)
BILIRUB SERPL-MCNC: 0.4 MG/DL (ref 0.2–1)
BILIRUB UR QL: NEGATIVE
BUN SERPL-MCNC: 15 MG/DL (ref 6–20)
BUN/CREAT SERPL: 18 (ref 12–20)
CA-I BLD-MCNC: 9.1 MG/DL (ref 8.5–10.1)
CHLORIDE SERPL-SCNC: 99 MMOL/L (ref 97–108)
CO2 SERPL-SCNC: 29 MMOL/L (ref 21–32)
COLOR UR: ABNORMAL
CREAT SERPL-MCNC: 0.84 MG/DL (ref 0.7–1.3)
EOSINOPHIL # BLD: 0 K/UL (ref 0–0.7)
EOSINOPHIL NFR BLD: 0 % (ref 0.9–2.9)
ERYTHROCYTE [DISTWIDTH] IN BLOOD BY AUTOMATED COUNT: 12.9 % (ref 11.5–14.5)
GLOBULIN SER CALC-MCNC: 4.4 G/DL (ref 2–4)
GLUCOSE SERPL-MCNC: 120 MG/DL (ref 65–100)
GLUCOSE UR STRIP.AUTO-MCNC: NEGATIVE MG/DL
HCT VFR BLD AUTO: 41.6 % (ref 41–53)
HGB BLD-MCNC: 14.1 G/DL (ref 13.5–17.5)
HGB UR QL STRIP: ABNORMAL
KETONES UR QL STRIP.AUTO: NEGATIVE MG/DL
LACTATE SERPL-SCNC: 0.8 MMOL/L (ref 0.4–2)
LEUKOCYTE ESTERASE UR QL STRIP.AUTO: ABNORMAL
LYMPHOCYTES # BLD: 1.6 K/UL (ref 1–4.8)
LYMPHOCYTES NFR BLD: 9 % (ref 20.5–51.1)
MCH RBC QN AUTO: 28.8 PG (ref 31–34)
MCHC RBC AUTO-ENTMCNC: 33.9 G/DL (ref 31–36)
MCV RBC AUTO: 85.1 FL (ref 80–100)
MONOCYTES # BLD: 1.2 K/UL (ref 0.2–2.4)
MONOCYTES NFR BLD: 7 % (ref 1.7–9.3)
NEUTS SEG # BLD: 14.5 K/UL (ref 1.8–7.7)
NEUTS SEG NFR BLD: 84 % (ref 42–75)
NITRITE UR QL STRIP.AUTO: POSITIVE
NRBC # BLD: 0 K/UL
NRBC BLD-RTO: 0 PER 100 WBC
PH UR STRIP: 7.5 [PH] (ref 5–8)
PLATELET # BLD AUTO: 370 K/UL
PMV BLD AUTO: 7.6 FL (ref 6.5–11.5)
POTASSIUM SERPL-SCNC: 3.5 MMOL/L (ref 3.5–5.1)
PROT SERPL-MCNC: 7.5 G/DL (ref 6.4–8.2)
PROT UR STRIP-MCNC: 30 MG/DL
RBC # BLD AUTO: 4.9 M/UL (ref 4.5–5.9)
RBC #/AREA URNS HPF: ABNORMAL /HPF (ref 0–3)
SODIUM SERPL-SCNC: 135 MMOL/L (ref 136–145)
SP GR UR REFRACTOMETRY: 1.01 (ref 1–1.03)
UROBILINOGEN UR QL STRIP.AUTO: 0.2 EU/DL (ref 0.2–1)
WBC # BLD AUTO: 17.3 K/UL (ref 4.4–11.3)
WBC URNS QL MICRO: ABNORMAL /HPF (ref 0–5)

## 2021-01-14 PROCEDURE — 74011000258 HC RX REV CODE- 258: Performed by: NURSE PRACTITIONER

## 2021-01-14 PROCEDURE — 99218 HC RM OBSERVATION: CPT

## 2021-01-14 PROCEDURE — 80053 COMPREHEN METABOLIC PANEL: CPT

## 2021-01-14 PROCEDURE — 85025 COMPLETE CBC W/AUTO DIFF WBC: CPT

## 2021-01-14 PROCEDURE — 36415 COLL VENOUS BLD VENIPUNCTURE: CPT

## 2021-01-14 PROCEDURE — 74011000250 HC RX REV CODE- 250: Performed by: EMERGENCY MEDICINE

## 2021-01-14 PROCEDURE — 99285 EMERGENCY DEPT VISIT HI MDM: CPT

## 2021-01-14 PROCEDURE — 96366 THER/PROPH/DIAG IV INF ADDON: CPT

## 2021-01-14 PROCEDURE — 96372 THER/PROPH/DIAG INJ SC/IM: CPT

## 2021-01-14 PROCEDURE — 87186 SC STD MICRODIL/AGAR DIL: CPT

## 2021-01-14 PROCEDURE — 87040 BLOOD CULTURE FOR BACTERIA: CPT

## 2021-01-14 PROCEDURE — 74011250637 HC RX REV CODE- 250/637: Performed by: HOSPITALIST

## 2021-01-14 PROCEDURE — 51702 INSERT TEMP BLADDER CATH: CPT

## 2021-01-14 PROCEDURE — 74011250636 HC RX REV CODE- 250/636: Performed by: EMERGENCY MEDICINE

## 2021-01-14 PROCEDURE — 74011000258 HC RX REV CODE- 258: Performed by: HOSPITALIST

## 2021-01-14 PROCEDURE — 74011250636 HC RX REV CODE- 250/636: Performed by: HOSPITALIST

## 2021-01-14 PROCEDURE — 81001 URINALYSIS AUTO W/SCOPE: CPT

## 2021-01-14 PROCEDURE — 96365 THER/PROPH/DIAG IV INF INIT: CPT

## 2021-01-14 PROCEDURE — 87077 CULTURE AEROBIC IDENTIFY: CPT

## 2021-01-14 PROCEDURE — 74011250636 HC RX REV CODE- 250/636: Performed by: NURSE PRACTITIONER

## 2021-01-14 PROCEDURE — 83605 ASSAY OF LACTIC ACID: CPT

## 2021-01-14 PROCEDURE — 87086 URINE CULTURE/COLONY COUNT: CPT

## 2021-01-14 PROCEDURE — 96367 TX/PROPH/DG ADDL SEQ IV INF: CPT

## 2021-01-14 PROCEDURE — 96376 TX/PRO/DX INJ SAME DRUG ADON: CPT

## 2021-01-14 RX ORDER — SODIUM CHLORIDE 0.9 % (FLUSH) 0.9 %
5-40 SYRINGE (ML) INJECTION AS NEEDED
Status: DISCONTINUED | OUTPATIENT
Start: 2021-01-14 | End: 2021-01-18 | Stop reason: HOSPADM

## 2021-01-14 RX ORDER — LIDOCAINE HYDROCHLORIDE 20 MG/ML
15 SOLUTION OROPHARYNGEAL AS NEEDED
Status: DISCONTINUED | OUTPATIENT
Start: 2021-01-14 | End: 2021-01-18 | Stop reason: HOSPADM

## 2021-01-14 RX ORDER — SODIUM CHLORIDE 0.9 % (FLUSH) 0.9 %
5-40 SYRINGE (ML) INJECTION EVERY 8 HOURS
Status: DISCONTINUED | OUTPATIENT
Start: 2021-01-14 | End: 2021-01-18 | Stop reason: HOSPADM

## 2021-01-14 RX ORDER — ONDANSETRON 2 MG/ML
4 INJECTION INTRAMUSCULAR; INTRAVENOUS
Status: DISCONTINUED | OUTPATIENT
Start: 2021-01-14 | End: 2021-01-18 | Stop reason: HOSPADM

## 2021-01-14 RX ORDER — LEVOFLOXACIN 5 MG/ML
750 INJECTION, SOLUTION INTRAVENOUS
Status: COMPLETED | OUTPATIENT
Start: 2021-01-14 | End: 2021-01-14

## 2021-01-14 RX ORDER — PHENAZOPYRIDINE HYDROCHLORIDE 100 MG/1
200 TABLET, FILM COATED ORAL EVERY 8 HOURS
Status: COMPLETED | OUTPATIENT
Start: 2021-01-14 | End: 2021-01-15

## 2021-01-14 RX ORDER — MELATONIN
500 DAILY
Status: DISCONTINUED | OUTPATIENT
Start: 2021-01-14 | End: 2021-01-18 | Stop reason: HOSPADM

## 2021-01-14 RX ORDER — TAMSULOSIN HYDROCHLORIDE 0.4 MG/1
0.4 CAPSULE ORAL DAILY
Status: DISCONTINUED | OUTPATIENT
Start: 2021-01-14 | End: 2021-01-18 | Stop reason: HOSPADM

## 2021-01-14 RX ORDER — POLYETHYLENE GLYCOL 3350 17 G/17G
17 POWDER, FOR SOLUTION ORAL DAILY PRN
Status: DISCONTINUED | OUTPATIENT
Start: 2021-01-14 | End: 2021-01-18 | Stop reason: HOSPADM

## 2021-01-14 RX ORDER — CLONAZEPAM 0.5 MG/1
0.25 TABLET ORAL 2 TIMES DAILY
Status: DISCONTINUED | OUTPATIENT
Start: 2021-01-14 | End: 2021-01-18 | Stop reason: HOSPADM

## 2021-01-14 RX ORDER — GABAPENTIN 300 MG/1
300 CAPSULE ORAL 3 TIMES DAILY
Status: DISCONTINUED | OUTPATIENT
Start: 2021-01-14 | End: 2021-01-18 | Stop reason: HOSPADM

## 2021-01-14 RX ORDER — MORPHINE SULFATE 10 MG/ML
2 INJECTION, SOLUTION INTRAMUSCULAR; INTRAVENOUS
Status: DISCONTINUED | OUTPATIENT
Start: 2021-01-14 | End: 2021-01-18 | Stop reason: HOSPADM

## 2021-01-14 RX ORDER — SODIUM CHLORIDE 0.9 % (FLUSH) 0.9 %
5-40 SYRINGE (ML) INJECTION EVERY 8 HOURS
Status: DISCONTINUED | OUTPATIENT
Start: 2021-01-14 | End: 2021-01-14

## 2021-01-14 RX ORDER — ACETAMINOPHEN 650 MG/1
650 SUPPOSITORY RECTAL
Status: DISCONTINUED | OUTPATIENT
Start: 2021-01-14 | End: 2021-01-18 | Stop reason: HOSPADM

## 2021-01-14 RX ORDER — CYCLOBENZAPRINE HCL 10 MG
5 TABLET ORAL
Status: DISCONTINUED | OUTPATIENT
Start: 2021-01-14 | End: 2021-01-18 | Stop reason: HOSPADM

## 2021-01-14 RX ORDER — NALOXONE HYDROCHLORIDE 0.4 MG/ML
0.4 INJECTION, SOLUTION INTRAMUSCULAR; INTRAVENOUS; SUBCUTANEOUS AS NEEDED
Status: DISCONTINUED | OUTPATIENT
Start: 2021-01-14 | End: 2021-01-18 | Stop reason: HOSPADM

## 2021-01-14 RX ORDER — ASPIRIN 325 MG/1
100 TABLET, FILM COATED ORAL DAILY
Status: DISCONTINUED | OUTPATIENT
Start: 2021-01-14 | End: 2021-01-18 | Stop reason: HOSPADM

## 2021-01-14 RX ORDER — SODIUM CHLORIDE 0.9 % (FLUSH) 0.9 %
5-40 SYRINGE (ML) INJECTION AS NEEDED
Status: DISCONTINUED | OUTPATIENT
Start: 2021-01-14 | End: 2021-01-14

## 2021-01-14 RX ORDER — GUAIFENESIN 100 MG/5ML
81 LIQUID (ML) ORAL DAILY
Status: DISCONTINUED | OUTPATIENT
Start: 2021-01-14 | End: 2021-01-18 | Stop reason: HOSPADM

## 2021-01-14 RX ORDER — ENOXAPARIN SODIUM 100 MG/ML
40 INJECTION SUBCUTANEOUS DAILY
Status: DISCONTINUED | OUTPATIENT
Start: 2021-01-14 | End: 2021-01-18 | Stop reason: HOSPADM

## 2021-01-14 RX ORDER — ACETAMINOPHEN 325 MG/1
650 TABLET ORAL
Status: DISCONTINUED | OUTPATIENT
Start: 2021-01-14 | End: 2021-01-18 | Stop reason: HOSPADM

## 2021-01-14 RX ORDER — PROMETHAZINE HYDROCHLORIDE 25 MG/1
12.5 TABLET ORAL
Status: DISCONTINUED | OUTPATIENT
Start: 2021-01-14 | End: 2021-01-18 | Stop reason: HOSPADM

## 2021-01-14 RX ORDER — FAMOTIDINE 20 MG/1
20 TABLET, FILM COATED ORAL 2 TIMES DAILY
Status: DISCONTINUED | OUTPATIENT
Start: 2021-01-14 | End: 2021-01-14

## 2021-01-14 RX ORDER — BISMUTH SUBSALICYLATE 262 MG
1 TABLET,CHEWABLE ORAL
Status: DISCONTINUED | OUTPATIENT
Start: 2021-01-14 | End: 2021-01-18 | Stop reason: HOSPADM

## 2021-01-14 RX ORDER — FAMOTIDINE 20 MG/1
20 TABLET, FILM COATED ORAL 2 TIMES DAILY
Status: DISCONTINUED | OUTPATIENT
Start: 2021-01-14 | End: 2021-01-18 | Stop reason: HOSPADM

## 2021-01-14 RX ORDER — SODIUM CHLORIDE 9 MG/ML
125 INJECTION, SOLUTION INTRAVENOUS ONCE
Status: COMPLETED | OUTPATIENT
Start: 2021-01-14 | End: 2021-01-14

## 2021-01-14 RX ADMIN — MULTIVITAMIN TABLET 1 TABLET: TABLET at 09:31

## 2021-01-14 RX ADMIN — GABAPENTIN 300 MG: 300 CAPSULE ORAL at 22:51

## 2021-01-14 RX ADMIN — ASPIRIN 81 MG 81 MG: 81 TABLET ORAL at 09:30

## 2021-01-14 RX ADMIN — PIPERACILLIN SODIUM AND TAZOBACTAM SODIUM 4.5 G: 4; .5 INJECTION, POWDER, LYOPHILIZED, FOR SOLUTION INTRAVENOUS at 06:04

## 2021-01-14 RX ADMIN — SODIUM CHLORIDE 125 ML/HR: 9 INJECTION, SOLUTION INTRAVENOUS at 01:58

## 2021-01-14 RX ADMIN — Medication 10 ML: at 16:45

## 2021-01-14 RX ADMIN — PIPERACILLIN SODIUM AND TAZOBACTAM SODIUM 4.5 G: 4; .5 INJECTION, POWDER, LYOPHILIZED, FOR SOLUTION INTRAVENOUS at 12:37

## 2021-01-14 RX ADMIN — PHENAZOPYRIDINE 200 MG: 100 TABLET ORAL at 22:51

## 2021-01-14 RX ADMIN — CLONAZEPAM 0.25 MG: 0.5 TABLET ORAL at 19:41

## 2021-01-14 RX ADMIN — THIAMINE HCL TAB 100 MG 100 MG: 100 TAB at 09:30

## 2021-01-14 RX ADMIN — Medication 0.5 TABLET: at 09:30

## 2021-01-14 RX ADMIN — ENOXAPARIN SODIUM 40 MG: 40 INJECTION SUBCUTANEOUS at 09:30

## 2021-01-14 RX ADMIN — PIPERACILLIN SODIUM AND TAZOBACTAM SODIUM 4.5 G: 4; .5 INJECTION, POWDER, LYOPHILIZED, FOR SOLUTION INTRAVENOUS at 20:27

## 2021-01-14 RX ADMIN — LIDOCAINE HYDROCHLORIDE 15 ML: 20 SOLUTION ORAL; TOPICAL at 01:59

## 2021-01-14 RX ADMIN — Medication 10 ML: at 10:32

## 2021-01-14 RX ADMIN — LEVOFLOXACIN 750 MG: 5 INJECTION, SOLUTION INTRAVENOUS at 01:58

## 2021-01-14 RX ADMIN — GABAPENTIN 300 MG: 300 CAPSULE ORAL at 16:39

## 2021-01-14 RX ADMIN — FAMOTIDINE 20 MG: 20 TABLET ORAL at 09:30

## 2021-01-14 RX ADMIN — PHENAZOPYRIDINE 200 MG: 100 TABLET ORAL at 06:04

## 2021-01-14 RX ADMIN — PHENAZOPYRIDINE 200 MG: 100 TABLET ORAL at 16:44

## 2021-01-14 RX ADMIN — TAMSULOSIN HYDROCHLORIDE 0.4 MG: 0.4 CAPSULE ORAL at 09:31

## 2021-01-14 RX ADMIN — CLONAZEPAM 0.25 MG: 0.5 TABLET ORAL at 09:31

## 2021-01-14 RX ADMIN — FAMOTIDINE 20 MG: 20 TABLET ORAL at 19:41

## 2021-01-14 RX ADMIN — ACETAMINOPHEN 650 MG: 325 TABLET ORAL at 16:39

## 2021-01-14 RX ADMIN — GABAPENTIN 300 MG: 300 CAPSULE ORAL at 09:30

## 2021-01-14 RX ADMIN — Medication 10 ML: at 06:12

## 2021-01-14 RX ADMIN — Medication 10 ML: at 22:51

## 2021-01-14 NOTE — PROGRESS NOTES
Comprehensive Nutrition Assessment    Type and Reason for Visit: Initial    Nutrition Recommendations/Plan:   Continue w/ Regular Diet w/ chopped meats only   Ensure (strawberry or chocolate) x3/day   Jose Alfredo x2/day   Rec' Start MVI     Nutrition Assessment:  81-year-old male with a history of alcohol and drug abuse, tobacco use, MSSA bacteremia secondary to epidural abscess status post cervical laminectomy now quadriplegic status. Noted to have poor po intake at [facility], [place] may r/t that pt stated that he doesn't like the food r/t \"it's very bland\". Diet preferences as the following: chopped meats, hamburgers, grilled chicken, chicken salads, ice cream, pudding, ensure chocolate or ensure strawberry. Reported that he used to be on a double portion meals at NH, but stated it was too much food at one setting. Observed him eating his lunch tray @ 75%. Patient stated that he would like either chocolate or strawberry ensure at meals, and stated that he will try the Jose Alfredo to help w/ healing his wound. Pt has had a positive wt. gain 4lbs x 4m since last adm. Nutrition related lab values: Na (135), glucose (120), alb (3.1), total protien (wnl). Meds: vitamin D (500 units)    Malnutrition Assessment:  Malnutrition Status: Moderate malnutrition    Context:  Acute illness     Findings of the 6 clinical characteristics of malnutrition:   Energy Intake:  No significant decrease in energy intake  Weight Loss:  No significant weight loss     Body Fat Loss:  1 - Mild body fat loss, Buccal region, Orbital   Muscle Mass Loss:  7 - Moderate muscle mass loss, Clavicles (pectoralis & deltoids), Scapula (trapezius)  Fluid Accumulation:  Unable to assess,     Strength:  Not performed     Estimated Daily Nutrient Needs:  Energy (kcal): 1,900kcals (1,476. 75BMR x 1.3); Weight Used for Energy Requirements: Admission  Protein (g): 72g (1.2g/kg);  Weight Used for Protein Requirements: Current  Fluid (ml/day): 1,900ml; Method Used for Fluid Requirements: 1 ml/kcal      Nutrition Related Findings:  cogition: alert, awake. abdomenal: soft, non-tender. digestive system: no reported n/v, c/d nor dysphagia. extremities/muscle/bone: observed muscle wasting, per patient- he does PT @ NH x5/wk. Skin: scracal wound. Anthropometric Measures:  · Height:  6' (182.9 cm)  · Current Body Wt:  59.9 kg (132 lb)   · Admission Body Wt:  132 lb     · Ideal Body Wt:  178 lbs:  74.2 %   · Adjusted Body Weight:  148.5; Weight Adjustment for: Quadraplegia   · Adjusted BMI:  20.1    · BMI Category:  Normal weight (BMI 18.5-24. 9)       Nutrition Diagnosis:   · Moderate malnutrition related to other (specify)(poor po intake prior to adm) as evidenced by moderate loss of subcutaneous fat, moderate muscle loss      Nutrition Interventions:   Food and/or Nutrient Delivery: Modify current diet, Start oral nutrition supplement(chopped meats only, yanna x2/day, ensure x3/day)  Nutrition Education and Counseling: Education not indicated  Coordination of Nutrition Care: Continue to monitor while inpatient, Interdisciplinary rounds    Goals:  >75% of EENS, Wt. stability =/+ kg x 7days       Nutrition Monitoring and Evaluation:   Behavioral-Environmental Outcomes: None identified  Food/Nutrient Intake Outcomes: Food and nutrient intake, Supplement intake  Physical Signs/Symptoms Outcomes: Biochemical data, Weight    Discharge Planning:     Too soon to determine

## 2021-01-14 NOTE — PROGRESS NOTES
Patient seen and examined. H&P reviewed. Sepsis  -Patient presents with tachycardia and leukocytosis meeting sepsis criteria  -Sepsis due to urinary tract infection  -IV fluid per sepsis protocol, hemodynamically stable    Urinary tract infection  -Associated with indwelling Ferrell catheter  -Continue Zosyn  -Follow urine and blood cultures    Hyponatremia  -Given IV fluid in the ER  -Repeat labs in a.m.     Coronary artery disease  -Nonobstructive coronary artery disease per records  -Continue aspirin    Quadriplegia  -As a consequence of epidural abscess with cervical laminectomy  -Routine supportive care  -Neurogenic bladder dependent on urinary catheter    Sacral ulcer  -Sacral decubitus ulcer present  -Wound care daily with Silvadene    Malnutrition  -Moderate protein calorie malnutrition  -Nutrition consult

## 2021-01-14 NOTE — DISCHARGE INSTRUCTIONS
Patient Education      cardiac diet  Activity as tolerated  Sepsis: Care Instructions  Overview     Sepsis is an intense reaction to an infection. It can cause damage to the body and lead to dangerously low blood pressure. You may have inflammation across large areas of your body. It can damage tissue and even go deep into your organs. Infections that can lead to sepsis include:  · A skin infection such as from a cut. · A lung infection like pneumonia. · A kidney infection. · A gut infection such as E. coli. Sepsis is treated with antibiotics. Your doctor will try to find the infection that led to sepsis. Hemalatha Martinez also get fluids through a vein (IV). Machines will track your vital signs, including temperature, blood pressure, breathing rate, and pulse rate. The physical and mental effects of sepsis may not be seen for several weeks after treatment. And they may last long after the infection is gone. Physical problems may include:  · Feeling weak and tired. · Feeling out of breath. · Aches and pains. · Problems with getting around. · Trouble falling asleep or staying asleep. · Dry and itchy skin, brittle nails, and hair loss. Some of these effects can lead to problems with your organs or your feet, legs, hands, or arms. Sepsis can also affect your mind and emotions. Problems may include:  · Self-doubt. · Anxiety. · Nightmares. · Depression and mood problems. · Wanting to avoid other people. · Confusion. · Flashbacks and bad memories of your illness. It's important to care for yourself and try to avoid infections. This may lower your risk of getting sepsis again. Follow-up care is a key part of your treatment and safety. Be sure to make and go to all appointments, and call your doctor if you are having problems. It's also a good idea to know your test results and keep a list of the medicines you take. How can you care for yourself at home? · Be safe with medicines.  Take your medicines exactly as prescribed. Call your doctor if you think you are having a problem with your medicine. · If your doctor prescribed antibiotics, take them as directed. Do not stop taking them just because you feel better. You need to take the full course of antibiotics. · Help prevent infections that could again lead to sepsis. ? Try to avoid colds and flu. If you must be around people who have a cold or the flu, wash your hands often. And get a flu vaccine every year. ? Ask your doctor if you need a pneumococcal vaccine (to prevent pneumonia, meningitis, and other infections). If you have had one before, ask your doctor if you need another dose. ? Clean any wounds or scrapes. · Do not smoke or use other tobacco products. When you quit smoking, you are less likely to get a cold, the flu, bronchitis, and pneumonia. If you need help quitting, talk to your doctor about stop-smoking programs and medicines. These can increase your chances of quitting for good. · Drink plenty of fluids to prevent dehydration. Choose water and other caffeine-free clear liquids until you feel better. If you have kidney, heart, or liver disease and have to limit fluids, talk with your doctor before you increase the amount of fluids you drink. · Eat a healthy diet. Include fruits, vegetables, and whole grains in your diet every day. · If your doctor recommends it, try doing some physical activity. Walking is a good choice. Bit by bit, increase the amount you walk every day. · Talk with your family and friends about your challenges. Ask for help if you need it. · Keep a journal. Writing down your thoughts and feelings can help reduce your stress. · Ask family members to fill in gaps in your memory. · Set small goals for yourself that you can reach. Reward yourself for success. When should you call for help? Call  911 anytime you think you may need emergency care. For example, call if:    · You passed out (lost consciousness).    Call your doctor now or seek immediate medical care if:    · You have symptoms such as:  ? Shortness of breath. ? Feeling very sick. ? Severe pain. ? A fast heart rate. ? Cool, pale, or clammy skin. ? Feeling confused. ? Feeling very sleepy, or you are hard to wake up.     · You are dizzy or lightheaded, or you feel like you may faint.     · You have a fever or chills. Watch closely for changes in your health, and be sure to contact your doctor if:    · You do not get better as expected. Where can you learn more? Go to http://www.gray.com/  Enter T383 in the search box to learn more about \"Sepsis: Care Instructions. \"  Current as of: February 11, 2020               Content Version: 12.6  © 0398-5889 [employer]. Care instructions adapted under license by [employer] (which disclaims liability or warranty for this information). If you have questions about a medical condition or this instruction, always ask your healthcare professional. [address] any warranty or liability for your use of this information.

## 2021-01-14 NOTE — ED PROVIDER NOTES
Patient was sent from the nursing home for lower abdominal pain and fever. He has an indwelling Ferrell catheter that has not been changed for a while. He reports pain with pressure. Urine is very cloudy and he has had several urinary tract infections in the past. The catheter was replaced by the ER nurse, without complications. Patient is paraplegic. He has a sacral ulcer and gets wound care           Past Medical History:   Diagnosis Date    History of spinal surgery     Urinary tract infection     frequent uti       Past Surgical History:   Procedure Laterality Date    HX ORTHOPAEDIC           History reviewed. No pertinent family history.     Social History     Socioeconomic History    Marital status: SINGLE     Spouse name: Not on file    Number of children: Not on file    Years of education: Not on file    Highest education level: Not on file   Occupational History    Not on file   Social Needs    Financial resource strain: Not on file    Food insecurity     Worry: Not on file     Inability: Not on file    Transportation needs     Medical: Not on file     Non-medical: Not on file   Tobacco Use    Smoking status: Former Smoker     Types: Cigarettes    Smokeless tobacco: Never Used   Substance and Sexual Activity    Alcohol use: Not Currently    Drug use: Never    Sexual activity: Not Currently   Lifestyle    Physical activity     Days per week: 0 days     Minutes per session: 0 min    Stress: Very much   Relationships    Social connections     Talks on phone: More than three times a week     Gets together: Never     Attends Islam service: Never     Active member of club or organization: No     Attends meetings of clubs or organizations: Never     Relationship status: Not on file    Intimate partner violence     Fear of current or ex partner: No     Emotionally abused: No     Physically abused: No     Forced sexual activity: No   Other Topics Concern   [address] Service Not Asked    Blood Transfusions Not Asked    Caffeine Concern Not Asked    Occupational Exposure Not Asked    Hobby Hazards Not Asked    Sleep Concern Not Asked    Stress Concern Not Asked    Weight Concern Not Asked    Special Diet Not Asked    Back Care Not Asked    Exercise Not Asked    Bike Helmet Not Asked   [address] Not Asked    Self-Exams Not Asked   Social History Narrative    Not on file         ALLERGIES: Patient has no known allergies. Review of Systems   Constitutional: Positive for fever. HENT: Negative. Eyes: Negative. Respiratory: Negative. Negative for shortness of breath. Cardiovascular: Negative. Gastrointestinal: Positive for abdominal pain. Negative for blood in stool, nausea and vomiting. Endocrine: Negative. Genitourinary: Positive for dysuria. Musculoskeletal: Negative. Skin: Negative. Allergic/Immunologic: Negative. Neurological: Negative. Hematological: Negative. Psychiatric/Behavioral: Negative. Vitals:    01/14/21 0013   BP: (!) 136/92   Pulse: (!) 116   Resp: 20   Temp: 100 °F (37.8 °C)   SpO2: 91%   Weight: 60 kg (132 lb 4.4 oz)   Height: 6' (1.829 m)            Physical Exam  Vitals signs and nursing note reviewed. HENT:      Head: Normocephalic and atraumatic. Neck:      Musculoskeletal: Normal range of motion and neck supple. Cardiovascular:      Rate and Rhythm: Normal rate and regular rhythm. Pulses: Normal pulses. Heart sounds: Normal heart sounds. Pulmonary:      Effort: Pulmonary effort is normal.      Breath sounds: Normal breath sounds. Abdominal:      General: Abdomen is flat. Bowel sounds are normal.      Palpations: Abdomen is soft. Tenderness: There is abdominal tenderness in the suprapubic area. There is no guarding or rebound. Genitourinary:     Penis: Circumcised. Musculoskeletal: Normal range of motion. Skin:     General: Skin is warm and dry. Findings: Wound present. Comments: Sacral ulcer with granulation tissue   Neurological:      General: No focal deficit present. Mental Status: He is oriented to person, place, and time. Mental status is at baseline.    Psychiatric:         Mood and Affect: Mood normal.         Behavior: Behavior normal.          MDM  Number of Diagnoses or Management Options  Risk of Complications, Morbidity, and/or Mortality  Presenting problems: moderate  Diagnostic procedures: moderate  Management options: moderate  General comments: Discussed wwith Dr Mindi Negrete and he will admit patient    Patient Progress  Patient progress: stable    ED Course as of Jan 14 0402   Thu Jan 14, 2021   0400 Lactic acid: 0.8 [BH]   0400 Sodium(!): 135 [BH]   0400 Glucose(!): 120 [BH]   0400 WBC:  [BH]   0400 RBC:  [BH]   0400 Bacteria(!): 4+ [BH]   0400 Amorphous Crystals(!): 4+ [BH]   0400 WBC(!): 17.3 [BH]      ED Course User Index  [BH] Alvaro Callahan MD       Procedures

## 2021-01-14 NOTE — H&P
History & Physical    Primary Care Provider: Robbie Mariano MD  Source of Information: Patient and ED physician, chart review    History of Presenting Illness: This is a 56-year-old male with a history of alcohol and drug abuse, tobacco use, MSSA bacteremia secondary to epidural abscess status post cervical laminectomy now quadriplegic status post trach trach placement and PEG placement decannulation with a chronic Ferrell catheter secondary nursing facility when he was tolerating having fever. In the emergency the patient was found to have a Ferrell catheter which was not replaced for a long time this was replaced in the emergency with focal urine output, in the 90s the patient was found to be tachycardic with leukocytosis and temperature of 100 and abnormal UA. Given the patient's history and sepsis-like picture hospitalist was called to admit the patient for IV antibiotics. The patient was given levofloxacin in the emergency  During my exam the patient is alert and oriented answering all questions appropriately. Denies headache, LOC, seizures, lethargy, weakness. no Chest pain, SOB, palpitation, cough or fever, no abdominal pain, nausea or vomiting. no bowel or bladder issues, no extremity swelling, no focal weakness, no recent travel, taking medications regulary, no sick contacts       Review of Systems: As per HPI  As per HPI     Past Medical History:   Diagnosis Date    History of spinal surgery     Urinary tract infection     frequent uti      Past Surgical History:   Procedure Laterality Date    HX ORTHOPAEDIC       Prior to Admission medications    Medication Sig Start Date End Date Taking? Authorizing Provider   acetaminophen (TYLENOL) 325 mg tablet Take 650 mg by mouth every four (4) hours as needed. 7/23/20   Provider, Historical   aspirin 81 mg chewable tablet Take 81 mg by mouth daily.  7/28/20   Provider, Historical   cholecalciferol (VITAMIN D3) (1000 Units /25 mcg) tablet Take 500 Units by mouth daily. 7/24/20   Provider, Historical   clonazePAM (KlonoPIN) 0.5 mg tablet Take 0.25 mg by mouth two (2) times a day. 7/23/20   Provider, Historical   cyclobenzaprine (FLEXERIL) 5 mg tablet Take 5 mg by mouth every eight (8) hours as needed. 7/23/20   Provider, Historical   enoxaparin (LOVENOX) 40 mg/0.4 mL 40 mg by SubCUTAneous route. 7/23/20   Provider, Historical   famotidine (PEPCID) 20 mg tablet Take 20 mg by mouth two (2) times a day. 7/28/20   Provider, Historical   gabapentin (NEURONTIN) 300 mg capsule Take 300 mg by mouth three (3) times daily. 7/23/20   Provider, Historical   albuterol-ipratropium (DUO-NEB) 2.5 mg-0.5 mg/3 ml nebu Take 3 mL by inhalation every six (6) hours as needed. 7/23/20   Provider, Historical   thiamine HCL (B-1) 100 mg tablet Take 200 mg by mouth daily. 7/28/20   Provider, Historical   tamsulosin (FLOMAX) 0.4 mg capsule Take 0.4 mg by mouth. 7/23/20   Provider, Historical   multivit-min-iron-folic-vit K1 8 US-288 mcg- 10 mcg chew Take 1 Tab by mouth daily.  7/28/20   Provider, Historical       Current Facility-Administered Medications:     lidocaine (XYLOCAINE) 2 % viscous solution 15 mL, 15 mL, Mouth/Throat, PRN, Julio César Marie MD, 15 mL at 01/14/21 0159    acetaminophen (TYLENOL) tablet 650 mg, 650 mg, Oral, Q6H PRN **OR** acetaminophen (TYLENOL) suppository 650 mg, 650 mg, Rectal, Q6H PRN, Contreras Lan MD    polyethylene glycol (MIRALAX) packet 17 g, 17 g, Oral, DAILY PRN, Contreras Lan MD    promethazine (PHENERGAN) tablet 12.5 mg, 12.5 mg, Oral, Q6H PRN **OR** ondansetron (ZOFRAN) injection 4 mg, 4 mg, IntraVENous, Q6H PRN, Contreras Lan MD    enoxaparin (LOVENOX) injection 40 mg, 40 mg, SubCUTAneous, DAILY, Contreras Lan MD    famotidine (PEPCID) tablet 20 mg, 20 mg, Oral, BID, Contreras Lan MD    sodium chloride (NS) flush 5-40 mL, 5-40 mL, IntraVENous, Q8H, Contreras Lan MD    sodium chloride (NS) flush 5-40 mL, 5-40 mL, IntraVENous, PRN, Rohit Brody MD  •  phenazopyridine (PYRIDIUM) tablet 200 mg, 200 mg, Oral, Q8H, Rohit Brody MD  •  morphine 10 mg/ml injection 2 mg, 2 mg, IntraVENous, Q4H PRN, Rohit Brody MD  •  naloxone (NARCAN) injection 0.4 mg, 0.4 mg, IntraVENous, PRN, Rohit Brody MD  •  piperacillin-tazobactam (ZOSYN) 4.5 g in 0.9% sodium chloride (MBP/ADV) 100 mL MBP, 4.5 g, IntraVENous, Q6H, Rohit Brody MD    Current Outpatient Medications:   •  acetaminophen (TYLENOL) 325 mg tablet, Take 650 mg by mouth every four (4) hours as needed., Disp: , Rfl:   •  aspirin 81 mg chewable tablet, Take 81 mg by mouth daily., Disp: , Rfl:   •  cholecalciferol (VITAMIN D3) (1000 Units /25 mcg) tablet, Take 500 Units by mouth daily., Disp: , Rfl:   •  clonazePAM (KlonoPIN) 0.5 mg tablet, Take 0.25 mg by mouth two (2) times a day., Disp: , Rfl:   •  cyclobenzaprine (FLEXERIL) 5 mg tablet, Take 5 mg by mouth every eight (8) hours as needed., Disp: , Rfl:   •  enoxaparin (LOVENOX) 40 mg/0.4 mL, 40 mg by SubCUTAneous route., Disp: , Rfl:   •  famotidine (PEPCID) 20 mg tablet, Take 20 mg by mouth two (2) times a day., Disp: , Rfl:   •  gabapentin (NEURONTIN) 300 mg capsule, Take 300 mg by mouth three (3) times daily., Disp: , Rfl:   •  albuterol-ipratropium (DUO-NEB) 2.5 mg-0.5 mg/3 ml nebu, Take 3 mL by inhalation every six (6) hours as needed., Disp: , Rfl:   •  thiamine HCL (B-1) 100 mg tablet, Take 200 mg by mouth daily., Disp: , Rfl:   •  tamsulosin (FLOMAX) 0.4 mg capsule, Take 0.4 mg by mouth., Disp: , Rfl:   •  multivit-min-iron-folic-vit K1 8 mg-400 mcg- 10 mcg chew, Take 1 Tab by mouth daily., Disp: , Rfl:     No Known Allergies   History reviewed. No pertinent family history.     SOCIAL HISTORY:  Patient resides: Other assisted living facility  Independently    Assisted Living    SNF    With family care       Smoking history:   None    Former    Chronic      Alcohol history:   None    Social    Chronic   Ambulates: Paraplegic  Independently    w/cane    w/walker    w/wc    CODE STATUS: Full  DNR    Full    Other      Objective:     Physical Exam:     Visit Vitals  BP (!) 96/53 (BP 1 Location: Right arm, BP Patient Position: Supine; Head of bed elevated (Comment degrees))   Pulse (!) 112   Temp 100 °F (37.8 °C)   Resp 18   Ht 6' (1.829 m)   Wt 60 kg (132 lb 4.4 oz)   SpO2 96%   BMI 17.94 kg/m²      O2 Device: Room air      General:  Alert and oriented, No acute distress. HENT:  Normocephalic. Neck:  Within normal limits,   Respiratory:  Lungs are clear to auscultation, Respirations are non-labored. Cardiovascular:  Normal rate, Regular rhythm. Gastrointestinal:  Soft, Non-tender, Non-distended, Normal bowel sounds. Genitourinary:  No costovertebral angle tenderness. Ferrell catheter replaced  Musculoskeletal: Paraplegic  Neurologic:  Alert, Oriented. Paraplegic  Cognition and Speech:  Oriented, Speech clear and coherent. Psychiatric:  Cooperative, Appropriate mood & affect. EKG:  normal EKG, normal sinus rhythm, unchanged from previous tracings, sinus tachycardia. Sinus tachycardia no ST-T changes    Data Review:     Recent Days:  Recent Labs     01/14/21  0110   WBC 17.3*   HGB 14.1   HCT 41.6        Recent Labs     01/14/21  0110   *   K 3.5   CL 99   CO2 29   *   BUN 15   CREA 0.84   CA 9.1   ALB 3.1*   TBILI 0.4   ALT 53     No results for input(s): PH, PCO2, PO2, HCO3, FIO2 in the last 72 hours.     24 Hour Results:  Recent Results (from the past 24 hour(s))   CBC WITH AUTOMATED DIFF    Collection Time: 01/14/21  1:10 AM   Result Value Ref Range    WBC 17.3 (H) 4.4 - 11.3 K/uL    RBC 4.90 4.50 - 5.90 M/uL    HGB 14.1 13.5 - 17.5 g/dL    HCT 41.6 41 - 53 %    MCV 85.1 80 - 100 FL    MCH 28.8 (L) 31 - 34 PG    MCHC 33.9 31.0 - 36.0 g/dL    RDW 12.9 11.5 - 14.5 %    PLATELET 556 K/uL    MPV 7.6 6.5 - 11.5 FL    NRBC 0.0  WBC    ABSOLUTE NRBC 0.00 K/uL NEUTROPHILS 84 (H) 42 - 75 %    LYMPHOCYTES 9 (L) 20.5 - 51.1 %    MONOCYTES 7 1.7 - 9.3 %    EOSINOPHILS 0 (L) 0.9 - 2.9 %    BASOPHILS 0 0.0 - 2.5 %    ABS. NEUTROPHILS 14.5 (H) 1.8 - 7.7 K/UL    ABS. LYMPHOCYTES 1.6 1.0 - 4.8 K/UL    ABS. MONOCYTES 1.2 0.2 - 2.4 K/UL    ABS. EOSINOPHILS 0.0 0.0 - 0.7 K/UL    ABS. BASOPHILS 0.1 0.0 - 0.2 K/UL   METABOLIC PANEL, COMPREHENSIVE    Collection Time: 01/14/21  1:10 AM   Result Value Ref Range    Sodium 135 (L) 136 - 145 mmol/L    Potassium 3.5 3.5 - 5.1 mmol/L    Chloride 99 97 - 108 mmol/L    CO2 29 21 - 32 mmol/L    Anion gap 7 5 - 15 mmol/L    Glucose 120 (H) 65 - 100 mg/dL    BUN 15 6 - 20 mg/dL    Creatinine 0.84 0.70 - 1.30 mg/dL    BUN/Creatinine ratio 18 12 - 20      GFR est AA >60 >60 ml/min/1.73m2    GFR est non-AA >60 >60 ml/min/1.73m2    Calcium 9.1 8.5 - 10.1 mg/dL    Bilirubin, total 0.4 0.2 - 1.0 mg/dL    AST (SGOT) 33 15 - 37 U/L    ALT (SGPT) 53 12 - 78 U/L    Alk.  phosphatase 102 45 - 117 U/L    Protein, total 7.5 6.4 - 8.2 g/dL    Albumin 3.1 (L) 3.5 - 5.0 g/dL    Globulin 4.4 (H) 2.0 - 4.0 g/dL    A-G Ratio 0.7 (L) 1.1 - 2.2     LACTIC ACID    Collection Time: 01/14/21  1:10 AM   Result Value Ref Range    Lactic acid 0.8 0.4 - 2.0 mmol/L   URINALYSIS W/ RFLX MICROSCOPIC    Collection Time: 01/14/21  1:10 AM   Result Value Ref Range    Color Yellow/Straw      Appearance Cloudy (A) Clear      Specific gravity 1.010 1.003 - 1.030      pH (UA) 7.5 5.0 - 8.0      Protein 30 (A) Negative mg/dL    Glucose Negative Negative mg/dL    Ketone Negative Negative mg/dL    Bilirubin Negative Negative      Blood Large (A) Negative      Urobilinogen 0.2 0.2 - 1.0 EU/dL    Nitrites Positive (A) Negative      Leukocyte Esterase Large (A) Negative     URINE MICROSCOPIC    Collection Time: 01/14/21  1:10 AM   Result Value Ref Range    WBC  0 - 5 /hpf    RBC  0 - 3 /hpf    Bacteria 4+ (A) Negative /hpf    Amorphous Crystals 4+ (A) Negative         Imaging: Active Problems:    UTI (urinary tract infection) due to urinary indwelling Ferrell catheter ([facility] [address] (1/14/2021)       Assessment and plan    UTI secondary to indwelling Ferrell catheter  Patient has a history of recurrent UTI  This was replaced in the emergency on January 14, 2021  UA positive follow-up urine and blood culture  Early sepsis secondary to UTI  Patient's previous culture growing Pseudomonas and E. coli with some drug-resistant E. coli. I have started the patient on Zosyn may need to add levofloxacin or other agents to cover as per cultures growing. If no improvement with then may need CT of the abdomen and pelvis    Electrolyte abnormalities  Hyponatremia  Continue to monitor    History of extensive C-spine epidural abscess with MSSA bacteremia status post completion of antibiotics quadriplegic  Fall precautions  Patient does have decubitus ulcers  May need wound consult. Continue muscle relaxants and home medications. Normocytic anemia follow H&H    History of nonobstructive coronary artery disease  Left heart cath in March 2020 showed minimal obstruction of coronary arteries with normal ejection fraction  Continue aspirin  Essential anxiety  Continue Klonopin    Moderate poor protein energy malnutrition  Continue vitamins and ordered oral supplements  Nutrition consult    History of BPH continue Flomax          I was in my office while conducting this encounter. Consent:  He and/or his healthcare decision maker is aware that this patient-initiated Telehealth encounter is a billable service, with coverage as determined by his insurance carrier.  He is aware that he may receive a bill and has provided verbal consent to proceed: Yes    Communications  - Discussed with the emergency room physician  - Discussed with bedside nurse  - Patient updated of plan of care, all questions answered      Disposition  - Home when medically stable      The bedside nurse helped with the clinical examination and was able to move the stethoscope in appropriate directions. As the provider of this telehealth consult requested by the patient's evaluating physician, I attest that I introduced myself to the patient, provided my credentials, and determined that, based on a review of patient's chart and/or a discussion with the member of the patient's treatment team, telemedicine via a real time, 2 way, interactive audio and video platform is an appropriate and effective means of providing this service. The patient and I mutually agreed with continuation of this evaluation via telemedicine. This virtual encounter was taken place from [place], [place]. The encounter was approximately 35 minutes. The nurse India was present during the entire time of the encounter. The patient was evaluated at [facility] ED  Total Time: minutes: 21-30 minutes.       Signed By: Ashley Diego MD     January 14, 2021

## 2021-01-15 LAB
ALBUMIN SERPL-MCNC: 2.5 G/DL (ref 3.5–5)
ALBUMIN/GLOB SERPL: 0.6 {RATIO} (ref 1.1–2.2)
ALP SERPL-CCNC: 71 U/L (ref 45–117)
ALT SERPL-CCNC: 34 U/L (ref 12–78)
ANION GAP SERPL CALC-SCNC: 5 MMOL/L (ref 5–15)
AST SERPL W P-5'-P-CCNC: 17 U/L (ref 15–37)
BASOPHILS # BLD: 0.1 K/UL (ref 0–0.2)
BASOPHILS NFR BLD: 1 % (ref 0–2.5)
BILIRUB SERPL-MCNC: 0.3 MG/DL (ref 0.2–1)
BUN SERPL-MCNC: 13 MG/DL (ref 6–20)
BUN/CREAT SERPL: 19 (ref 12–20)
CA-I BLD-MCNC: 8.2 MG/DL (ref 8.5–10.1)
CHLORIDE SERPL-SCNC: 101 MMOL/L (ref 97–108)
CO2 SERPL-SCNC: 29 MMOL/L (ref 21–32)
CREAT SERPL-MCNC: 0.68 MG/DL (ref 0.7–1.3)
EOSINOPHIL # BLD: 0.1 K/UL (ref 0–0.7)
EOSINOPHIL NFR BLD: 1 % (ref 0.9–2.9)
ERYTHROCYTE [DISTWIDTH] IN BLOOD BY AUTOMATED COUNT: 13.1 % (ref 11.5–14.5)
GLOBULIN SER CALC-MCNC: 4.2 G/DL (ref 2–4)
GLUCOSE SERPL-MCNC: 101 MG/DL (ref 65–100)
HCT VFR BLD AUTO: 36.7 % (ref 41–53)
HGB BLD-MCNC: 12.1 G/DL (ref 13.5–17.5)
LYMPHOCYTES # BLD: 1.6 K/UL (ref 1–4.8)
LYMPHOCYTES NFR BLD: 12 % (ref 20.5–51.1)
MAGNESIUM SERPL-MCNC: 1.5 MG/DL (ref 1.6–2.4)
MCH RBC QN AUTO: 28.2 PG (ref 31–34)
MCHC RBC AUTO-ENTMCNC: 32.9 G/DL (ref 31–36)
MCV RBC AUTO: 85.8 FL (ref 80–100)
MONOCYTES # BLD: 1.3 K/UL (ref 0.2–2.4)
MONOCYTES NFR BLD: 10 % (ref 1.7–9.3)
NEUTS SEG # BLD: 10.5 K/UL (ref 1.8–7.7)
NEUTS SEG NFR BLD: 76 % (ref 42–75)
NRBC # BLD: 0.01 K/UL
NRBC BLD-RTO: 0 PER 100 WBC
PLATELET # BLD AUTO: 273 K/UL
PMV BLD AUTO: 7.7 FL (ref 6.5–11.5)
POTASSIUM SERPL-SCNC: 2.9 MMOL/L (ref 3.5–5.1)
PROT SERPL-MCNC: 6.7 G/DL (ref 6.4–8.2)
RBC # BLD AUTO: 4.28 M/UL (ref 4.5–5.9)
SODIUM SERPL-SCNC: 135 MMOL/L (ref 136–145)
WBC # BLD AUTO: 13.6 K/UL (ref 4.4–11.3)

## 2021-01-15 PROCEDURE — 85025 COMPLETE CBC W/AUTO DIFF WBC: CPT

## 2021-01-15 PROCEDURE — 83735 ASSAY OF MAGNESIUM: CPT

## 2021-01-15 PROCEDURE — 96372 THER/PROPH/DIAG INJ SC/IM: CPT

## 2021-01-15 PROCEDURE — 74011250636 HC RX REV CODE- 250/636: Performed by: HOSPITALIST

## 2021-01-15 PROCEDURE — 74011000258 HC RX REV CODE- 258: Performed by: FAMILY MEDICINE

## 2021-01-15 PROCEDURE — 74011250636 HC RX REV CODE- 250/636

## 2021-01-15 PROCEDURE — 74011250636 HC RX REV CODE- 250/636: Performed by: NURSE PRACTITIONER

## 2021-01-15 PROCEDURE — 99218 HC RM OBSERVATION: CPT

## 2021-01-15 PROCEDURE — 96376 TX/PRO/DX INJ SAME DRUG ADON: CPT

## 2021-01-15 PROCEDURE — 74011250637 HC RX REV CODE- 250/637: Performed by: FAMILY MEDICINE

## 2021-01-15 PROCEDURE — 36415 COLL VENOUS BLD VENIPUNCTURE: CPT

## 2021-01-15 PROCEDURE — 74011000258 HC RX REV CODE- 258: Performed by: NURSE PRACTITIONER

## 2021-01-15 PROCEDURE — 74011250637 HC RX REV CODE- 250/637: Performed by: HOSPITALIST

## 2021-01-15 PROCEDURE — 74011000250 HC RX REV CODE- 250: Performed by: FAMILY MEDICINE

## 2021-01-15 PROCEDURE — 80053 COMPREHEN METABOLIC PANEL: CPT

## 2021-01-15 PROCEDURE — 74011250636 HC RX REV CODE- 250/636: Performed by: FAMILY MEDICINE

## 2021-01-15 RX ORDER — SILVER SULFADIAZINE 10 G/1000G
CREAM TOPICAL DAILY
Status: DISCONTINUED | OUTPATIENT
Start: 2021-01-15 | End: 2021-01-18 | Stop reason: HOSPADM

## 2021-01-15 RX ORDER — OXYCODONE HYDROCHLORIDE 5 MG/1
5 TABLET ORAL
COMMUNITY

## 2021-01-15 RX ORDER — ZINC SULFATE 50(220)MG
220 CAPSULE ORAL DAILY
COMMUNITY

## 2021-01-15 RX ORDER — SODIUM CHLORIDE 900 MG/100ML
INJECTION INTRAVENOUS
Status: DISPENSED
Start: 2021-01-15 | End: 2021-01-15

## 2021-01-15 RX ORDER — MIRTAZAPINE 7.5 MG/1
7.5 TABLET, FILM COATED ORAL
COMMUNITY

## 2021-01-15 RX ORDER — POTASSIUM CHLORIDE 20 MEQ/1
20 TABLET, EXTENDED RELEASE ORAL 2 TIMES DAILY
Status: COMPLETED | OUTPATIENT
Start: 2021-01-15 | End: 2021-01-16

## 2021-01-15 RX ORDER — DOCUSATE SODIUM 100 MG/1
100 CAPSULE, LIQUID FILLED ORAL
COMMUNITY

## 2021-01-15 RX ORDER — PIPERACILLIN SODIUM, TAZOBACTAM SODIUM 4; .5 G/20ML; G/20ML
INJECTION, POWDER, LYOPHILIZED, FOR SOLUTION INTRAVENOUS
Status: COMPLETED
Start: 2021-01-15 | End: 2021-01-15

## 2021-01-15 RX ADMIN — POTASSIUM CHLORIDE 20 MEQ: 1500 TABLET, EXTENDED RELEASE ORAL at 09:00

## 2021-01-15 RX ADMIN — GABAPENTIN 300 MG: 300 CAPSULE ORAL at 16:49

## 2021-01-15 RX ADMIN — Medication 10 ML: at 20:31

## 2021-01-15 RX ADMIN — PIPERACILLIN SODIUM AND TAZOBACTAM SODIUM: 4; .5 INJECTION, POWDER, LYOPHILIZED, FOR SOLUTION INTRAVENOUS at 05:47

## 2021-01-15 RX ADMIN — Medication 10 ML: at 13:56

## 2021-01-15 RX ADMIN — PIPERACILLIN SODIUM AND TAZOBACTAM SODIUM 4.5 G: 4; .5 INJECTION, POWDER, LYOPHILIZED, FOR SOLUTION INTRAVENOUS at 05:46

## 2021-01-15 RX ADMIN — GABAPENTIN 300 MG: 300 CAPSULE ORAL at 20:30

## 2021-01-15 RX ADMIN — FAMOTIDINE 20 MG: 20 TABLET ORAL at 17:01

## 2021-01-15 RX ADMIN — ACETAMINOPHEN 650 MG: 325 TABLET ORAL at 20:30

## 2021-01-15 RX ADMIN — PHENAZOPYRIDINE 200 MG: 100 TABLET ORAL at 05:40

## 2021-01-15 RX ADMIN — MULTIVITAMIN TABLET 1 TABLET: TABLET at 09:00

## 2021-01-15 RX ADMIN — PIPERACILLIN AND TAZOBACTAM 3.38 G: 3; .375 INJECTION, POWDER, LYOPHILIZED, FOR SOLUTION INTRAVENOUS at 20:30

## 2021-01-15 RX ADMIN — ASPIRIN 81 MG 81 MG: 81 TABLET ORAL at 09:00

## 2021-01-15 RX ADMIN — TAMSULOSIN HYDROCHLORIDE 0.4 MG: 0.4 CAPSULE ORAL at 09:00

## 2021-01-15 RX ADMIN — Medication 10 ML: at 05:41

## 2021-01-15 RX ADMIN — THIAMINE HCL TAB 100 MG 100 MG: 100 TAB at 09:00

## 2021-01-15 RX ADMIN — POTASSIUM CHLORIDE 20 MEQ: 1500 TABLET, EXTENDED RELEASE ORAL at 17:00

## 2021-01-15 RX ADMIN — FAMOTIDINE 20 MG: 20 TABLET ORAL at 09:00

## 2021-01-15 RX ADMIN — PHENAZOPYRIDINE 200 MG: 100 TABLET ORAL at 13:56

## 2021-01-15 RX ADMIN — PHENAZOPYRIDINE 200 MG: 100 TABLET ORAL at 20:30

## 2021-01-15 RX ADMIN — CLONAZEPAM 0.25 MG: 0.5 TABLET ORAL at 09:00

## 2021-01-15 RX ADMIN — ACETAMINOPHEN 650 MG: 325 TABLET ORAL at 05:40

## 2021-01-15 RX ADMIN — Medication 0.5 TABLET: at 09:00

## 2021-01-15 RX ADMIN — SILVER SULFADIAZINE: 10 CREAM TOPICAL at 09:03

## 2021-01-15 RX ADMIN — CLONAZEPAM 0.25 MG: 0.5 TABLET ORAL at 17:00

## 2021-01-15 RX ADMIN — ENOXAPARIN SODIUM 40 MG: 40 INJECTION SUBCUTANEOUS at 09:00

## 2021-01-15 RX ADMIN — GABAPENTIN 300 MG: 300 CAPSULE ORAL at 09:00

## 2021-01-15 RX ADMIN — PIPERACILLIN AND TAZOBACTAM 3.38 G: 3; .375 INJECTION, POWDER, LYOPHILIZED, FOR SOLUTION INTRAVENOUS at 12:02

## 2021-01-15 NOTE — PROGRESS NOTES
Pt arrived to unit via bed, pt oriented to unit, vss on arrival,  meds administered per [facility], admission questions completed, bed in locked and low position with call bell within reach, pt denies any needs at this time, will continue to monitor

## 2021-01-15 NOTE — ED NOTES
TRANSFER - OUT REPORT:    Verbal report given to Laine Nicole RN on Teresa Baez  being transferred to [id number] for routine progression of care       Report consisted of patients Situation, Background, Assessment and   Recommendations(SBAR). Information from the following report(s) SBAR was reviewed with the receiving nurse. Opportunity for questions and clarification was provided.       Patient transported with:   [facility]

## 2021-01-15 NOTE — ROUTINE PROCESS
BedsideBedside shift change report given to lisa pham (oncoming nurse) by Qi Nunez (offgoing nurse). Report included the following information SBAR.

## 2021-01-16 ENCOUNTER — APPOINTMENT (OUTPATIENT)
Dept: CT IMAGING | Age: 56
DRG: 466 | End: 2021-01-16
Attending: HOSPITALIST
Payer: MEDICAID

## 2021-01-16 LAB
ANION GAP SERPL CALC-SCNC: 8 MMOL/L (ref 5–15)
BASOPHILS # BLD: 0 K/UL (ref 0–0.2)
BASOPHILS NFR BLD: 0 % (ref 0–2.5)
BUN SERPL-MCNC: 11 MG/DL (ref 6–20)
BUN/CREAT SERPL: 16 (ref 12–20)
CA-I BLD-MCNC: 8.6 MG/DL (ref 8.5–10.1)
CHLORIDE SERPL-SCNC: 102 MMOL/L (ref 97–108)
CO2 SERPL-SCNC: 27 MMOL/L (ref 21–32)
CREAT SERPL-MCNC: 0.67 MG/DL (ref 0.7–1.3)
EOSINOPHIL # BLD: 0.2 K/UL (ref 0–0.7)
EOSINOPHIL NFR BLD: 2 % (ref 0.9–2.9)
ERYTHROCYTE [DISTWIDTH] IN BLOOD BY AUTOMATED COUNT: 12.9 % (ref 11.5–14.5)
FLUAV AG NPH QL IA: NEGATIVE
FLUBV AG NOSE QL IA: NEGATIVE
GLUCOSE SERPL-MCNC: 95 MG/DL (ref 65–100)
HCT VFR BLD AUTO: 36.3 % (ref 41–53)
HGB BLD-MCNC: 12.2 G/DL (ref 13.5–17.5)
LYMPHOCYTES # BLD: 1.3 K/UL (ref 1–4.8)
LYMPHOCYTES NFR BLD: 13 % (ref 20.5–51.1)
MAGNESIUM SERPL-MCNC: 1.6 MG/DL (ref 1.6–2.4)
MCH RBC QN AUTO: 28.9 PG (ref 31–34)
MCHC RBC AUTO-ENTMCNC: 33.6 G/DL (ref 31–36)
MCV RBC AUTO: 85.9 FL (ref 80–100)
MONOCYTES # BLD: 1.1 K/UL (ref 0.2–2.4)
MONOCYTES NFR BLD: 11 % (ref 1.7–9.3)
NEUTS SEG # BLD: 7.3 K/UL (ref 1.8–7.7)
NEUTS SEG NFR BLD: 74 % (ref 42–75)
NRBC # BLD: 0 K/UL
NRBC BLD-RTO: 0 PER 100 WBC
PLATELET # BLD AUTO: 284 K/UL
PMV BLD AUTO: 7.7 FL (ref 6.5–11.5)
POTASSIUM SERPL-SCNC: 3.5 MMOL/L (ref 3.5–5.1)
RBC # BLD AUTO: 4.23 M/UL (ref 4.5–5.9)
SODIUM SERPL-SCNC: 137 MMOL/L (ref 136–145)
WBC # BLD AUTO: 10 K/UL (ref 4.4–11.3)

## 2021-01-16 PROCEDURE — 36415 COLL VENOUS BLD VENIPUNCTURE: CPT

## 2021-01-16 PROCEDURE — 80048 BASIC METABOLIC PNL TOTAL CA: CPT

## 2021-01-16 PROCEDURE — 85025 COMPLETE CBC W/AUTO DIFF WBC: CPT

## 2021-01-16 PROCEDURE — 74011000258 HC RX REV CODE- 258: Performed by: FAMILY MEDICINE

## 2021-01-16 PROCEDURE — 99218 HC RM OBSERVATION: CPT

## 2021-01-16 PROCEDURE — 65270000029 HC RM PRIVATE

## 2021-01-16 PROCEDURE — 74011250636 HC RX REV CODE- 250/636: Performed by: HOSPITALIST

## 2021-01-16 PROCEDURE — 96376 TX/PRO/DX INJ SAME DRUG ADON: CPT

## 2021-01-16 PROCEDURE — 74011250637 HC RX REV CODE- 250/637: Performed by: HOSPITALIST

## 2021-01-16 PROCEDURE — 71250 CT THORAX DX C-: CPT

## 2021-01-16 PROCEDURE — 74011250636 HC RX REV CODE- 250/636: Performed by: FAMILY MEDICINE

## 2021-01-16 PROCEDURE — 83735 ASSAY OF MAGNESIUM: CPT

## 2021-01-16 PROCEDURE — 87804 INFLUENZA ASSAY W/OPTIC: CPT

## 2021-01-16 PROCEDURE — 74011250637 HC RX REV CODE- 250/637: Performed by: FAMILY MEDICINE

## 2021-01-16 RX ORDER — OXYCODONE HYDROCHLORIDE 5 MG/1
5 TABLET ORAL
Status: DISCONTINUED | OUTPATIENT
Start: 2021-01-16 | End: 2021-01-18 | Stop reason: HOSPADM

## 2021-01-16 RX ORDER — MAGNESIUM SULFATE HEPTAHYDRATE 40 MG/ML
2 INJECTION, SOLUTION INTRAVENOUS ONCE
Status: COMPLETED | OUTPATIENT
Start: 2021-01-16 | End: 2021-01-17

## 2021-01-16 RX ORDER — MIRTAZAPINE 15 MG/1
7.5 TABLET, FILM COATED ORAL
Status: DISCONTINUED | OUTPATIENT
Start: 2021-01-16 | End: 2021-01-18 | Stop reason: HOSPADM

## 2021-01-16 RX ORDER — POTASSIUM CHLORIDE 20 MEQ/1
20 TABLET, EXTENDED RELEASE ORAL DAILY
Status: DISCONTINUED | OUTPATIENT
Start: 2021-01-16 | End: 2021-01-18 | Stop reason: HOSPADM

## 2021-01-16 RX ORDER — IPRATROPIUM BROMIDE AND ALBUTEROL SULFATE 2.5; .5 MG/3ML; MG/3ML
3 SOLUTION RESPIRATORY (INHALATION)
Status: DISCONTINUED | OUTPATIENT
Start: 2021-01-16 | End: 2021-01-18 | Stop reason: HOSPADM

## 2021-01-16 RX ADMIN — PIPERACILLIN AND TAZOBACTAM 3.38 G: 3; .375 INJECTION, POWDER, LYOPHILIZED, FOR SOLUTION INTRAVENOUS at 13:07

## 2021-01-16 RX ADMIN — FAMOTIDINE 20 MG: 20 TABLET ORAL at 17:37

## 2021-01-16 RX ADMIN — PIPERACILLIN AND TAZOBACTAM 3.38 G: 3; .375 INJECTION, POWDER, LYOPHILIZED, FOR SOLUTION INTRAVENOUS at 03:22

## 2021-01-16 RX ADMIN — Medication 10 ML: at 21:31

## 2021-01-16 RX ADMIN — Medication 10 ML: at 16:37

## 2021-01-16 RX ADMIN — OXYCODONE 5 MG: 5 TABLET ORAL at 13:56

## 2021-01-16 RX ADMIN — CLONAZEPAM 0.25 MG: 0.5 TABLET ORAL at 08:38

## 2021-01-16 RX ADMIN — FAMOTIDINE 20 MG: 20 TABLET ORAL at 08:38

## 2021-01-16 RX ADMIN — GABAPENTIN 300 MG: 300 CAPSULE ORAL at 08:38

## 2021-01-16 RX ADMIN — MAGNESIUM SULFATE 2 G: 2 INJECTION INTRAVENOUS at 11:28

## 2021-01-16 RX ADMIN — MIRTAZAPINE 7.5 MG: 15 TABLET, FILM COATED ORAL at 21:30

## 2021-01-16 RX ADMIN — TAMSULOSIN HYDROCHLORIDE 0.4 MG: 0.4 CAPSULE ORAL at 08:38

## 2021-01-16 RX ADMIN — GABAPENTIN 300 MG: 300 CAPSULE ORAL at 16:37

## 2021-01-16 RX ADMIN — GABAPENTIN 300 MG: 300 CAPSULE ORAL at 21:30

## 2021-01-16 RX ADMIN — OXYCODONE 5 MG: 5 TABLET ORAL at 19:52

## 2021-01-16 RX ADMIN — CLONAZEPAM 0.25 MG: 0.5 TABLET ORAL at 17:37

## 2021-01-16 RX ADMIN — PIPERACILLIN AND TAZOBACTAM 3.38 G: 3; .375 INJECTION, POWDER, LYOPHILIZED, FOR SOLUTION INTRAVENOUS at 19:46

## 2021-01-16 RX ADMIN — Medication 0.5 TABLET: at 08:38

## 2021-01-16 RX ADMIN — ENOXAPARIN SODIUM 40 MG: 40 INJECTION SUBCUTANEOUS at 08:37

## 2021-01-16 RX ADMIN — THIAMINE HCL TAB 100 MG 100 MG: 100 TAB at 08:38

## 2021-01-16 RX ADMIN — MULTIVITAMIN TABLET 1 TABLET: TABLET at 08:38

## 2021-01-16 RX ADMIN — POTASSIUM CHLORIDE 20 MEQ: 1500 TABLET, EXTENDED RELEASE ORAL at 08:38

## 2021-01-16 RX ADMIN — ASPIRIN 81 MG 81 MG: 81 TABLET ORAL at 08:39

## 2021-01-16 RX ADMIN — POTASSIUM CHLORIDE 20 MEQ: 1500 TABLET, EXTENDED RELEASE ORAL at 13:07

## 2021-01-16 NOTE — PROGRESS NOTES
Problem: Falls - Risk of  Goal: *Absence of Falls  Description: Document Starleen Freeze Fall Risk and appropriate interventions in the flowsheet. Outcome: Progressing Towards Goal  Note: Fall Risk Interventions:            Medication Interventions: Patient to call before getting OOB    Elimination Interventions: Call light in reach              Problem: Patient Education: Go to Patient Education Activity  Goal: Patient/Family Education  Outcome: Progressing Towards Goal     Problem: Pressure Injury - Risk of  Goal: *Prevention of pressure injury  Description: Document Brayden Scale and appropriate interventions in the flowsheet.   Outcome: Progressing Towards Goal  Note: Pressure Injury Interventions:  Sensory Interventions: Float heels, Keep linens dry and wrinkle-free    Moisture Interventions: Minimize layers         Mobility Interventions: Float heels    Nutrition Interventions: Document food/fluid/supplement intake    Friction and Shear Interventions: Minimize layers                Problem: Patient Education: Go to Patient Education Activity  Goal: Patient/Family Education  Outcome: Progressing Towards Goal

## 2021-01-16 NOTE — PROGRESS NOTES
Progress Note  Date:1/16/2021       Room:[facility]  Patient Jose De Jesus Dale     YOB: 1965     Age:55 y.o. Subjective    This is a 67-year-old male with a history of alcohol and drug abuse, tobacco use, MSSA bacteremia secondary to epidural abscess status post cervical laminectomy now quadriplegic status post trach trach placement and PEG placement decannulation with a chronic Ferrell catheter secondary nursing facility when he was tolerating having fever. In the emergency the patient was found to have a Ferrell catheter which was not replaced for a long time this was replaced in the emergency with focal urine output, in the 90s the patient was found to be tachycardic with leukocytosis and temperature of 100 and abnormal UA. Given the patient's history and sepsis-like picture hospitalist was called to admit the patient for IV antibiotics. The patient was given levofloxacin in the emergency  During my exam the patient is alert and oriented answering all questions appropriately. Denies headache, LOC, seizures, lethargy, weakness. no Chest pain, SOB, palpitation, cough or fever, no abdominal pain, nausea or vomiting. no bowel or bladder issues, no extremity swelling, no focal weakness, no recent travel, taking medications regulary, no sick contacts    Seen and evaluated. Still having high fever liters of one 1.6. CT of the chest was done which did not show any acute pathology. Urine cultures positive for prevention sensitive to cephalosporins and penicillins. Does complain of chronic neck pain for which she is on oxycodone as needed    Review of Systems   Constitutional: Negative for chills, diaphoresis, fatigue and fever. HENT: Negative for congestion, ear pain, postnasal drip, sinus pain and sore throat. Eyes: Negative for pain, discharge and redness. Respiratory: Negative for cough, shortness of breath and wheezing. Cardiovascular: Negative for chest pain and palpitations. Gastrointestinal: Negative for abdominal pain, constipation, diarrhea, nausea and vomiting. Genitourinary: Negative for dysuria, flank pain, frequency and urgency. Musculoskeletal: Positive for neck pain. Negative for arthralgias and myalgias. Neurological: Negative for dizziness, weakness and headaches. Psychiatric/Behavioral: Negative for agitation and hallucinations. The patient is not nervous/anxious. Objective           Vitals Last 24 Hours:  Patient Vitals for the past 24 hrs:   Temp Pulse Resp BP SpO2   01/16/21 1204 96.8 °F (36 °C) 78 20 113/78 97 %   01/16/21 1200  82      01/16/21 0813 99.9 °F (37.7 °C) (!) 46 18 (!) 97/55 93 %   01/16/21 0514 (!) 101.6 °F (38.7 °C) 93 20 114/75 91 %   01/16/21 0051 98.4 °F (36.9 °C) 93 20 103/73 93 %   01/15/21 1950 (!) 102.2 °F (39 °C) (!) 101 20 104/65 94 %   01/15/21 1610 99.5 °F (37.5 °C) 91 18 114/80 95 %        I/O (24Hr): Intake/Output Summary (Last 24 hours) at 1/16/2021 1455  Last data filed at 1/15/2021 1845  Gross per 24 hour   Intake 240 ml   Output 1 ml   Net 239 ml       Physical Exam:  General: Alert, cooperative, no distress, appears stated age. Head:  Normocephalic, without obvious abnormality, atraumatic. Eyes:  Conjunctivae/corneas clear. Pupils equal, round, reactive to light. Extraocular movements intact. Lungs:  Clear to auscultation bilaterally. no wheeze, rales, crackles, rhonchi   Chest wall: No tenderness or deformity. Heart:  Regular rate and rhythm, S1, S2 normal, no murmur, click, rub or gallop. Abdomen:  Soft, non-tender. Bowel sounds normal. No masses,  No organomegaly. Extremities: Extremities normal, atraumatic, no cyanosis or edema. Pulses: 2+ and symmetric all extremities. Skin: Skin color, texture, turgor normal. No rashes or lesions  Neurologic: Awake, Alert, oriented.  No obvious gross sensory or motor deficits      Medications           Current Facility-Administered Medications   Medication Dose Route Frequency    potassium chloride (K-DUR, KLOR-CON) SR tablet 20 mEq  20 mEq Oral DAILY    oxyCODONE IR (ROXICODONE) tablet 5 mg  5 mg Oral Q4H PRN    mirtazapine (REMERON) tablet 7.5 mg  7.5 mg Oral QHS    albuterol-ipratropium (DUO-NEB) 2.5 MG-0.5 MG/3 ML  3 mL Nebulization Q6H PRN    silver sulfADIAZINE (SILVADENE) 1 % topical cream   Topical DAILY    piperacillin-tazobactam (ZOSYN) 3.375 g in 0.9% sodium chloride (MBP/ADV) 100 mL MBP  3.375 g IntraVENous Q8H    lidocaine (XYLOCAINE) 2 % viscous solution 15 mL  15 mL Mouth/Throat PRN    acetaminophen (TYLENOL) tablet 650 mg  650 mg Oral Q6H PRN    Or    acetaminophen (TYLENOL) suppository 650 mg  650 mg Rectal Q6H PRN    polyethylene glycol (MIRALAX) packet 17 g  17 g Oral DAILY PRN    promethazine (PHENERGAN) tablet 12.5 mg  12.5 mg Oral Q6H PRN    Or    ondansetron (ZOFRAN) injection 4 mg  4 mg IntraVENous Q6H PRN    enoxaparin (LOVENOX) injection 40 mg  40 mg SubCUTAneous DAILY    famotidine (PEPCID) tablet 20 mg  20 mg Oral BID    sodium chloride (NS) flush 5-40 mL  5-40 mL IntraVENous Q8H    sodium chloride (NS) flush 5-40 mL  5-40 mL IntraVENous PRN    morphine 10 mg/ml injection 2 mg  2 mg IntraVENous Q4H PRN    naloxone (NARCAN) injection 0.4 mg  0.4 mg IntraVENous PRN    aspirin chewable tablet 81 mg  81 mg Oral DAILY    cholecalciferol (VITAMIN D3) (1000 Units /25 mcg) tablet 0.5 Tab  500 Units Oral DAILY    clonazePAM (KlonoPIN) tablet 0.25 mg  0.25 mg Oral BID    cyclobenzaprine (FLEXERIL) tablet 5 mg  5 mg Oral Q8H PRN    gabapentin (NEURONTIN) capsule 300 mg  300 mg Oral TID    multivitamin (ONE A DAY) tablet 1 Tab  1 Tab Oral DAILY AFTER BREAKFAST    tamsulosin (FLOMAX) capsule 0.4 mg  0.4 mg Oral DAILY    thiamine mononitrate (B-1) tablet 100 mg  100 mg Oral DAILY         Allergies         Patient has no known allergies.        Labs/Imaging/Diagnostics      Labs:  Recent Results (from the past 48 hour(s))   METABOLIC PANEL, COMPREHENSIVE    Collection Time: 01/15/21  6:29 AM   Result Value Ref Range    Sodium 135 (L) 136 - 145 mmol/L    Potassium 2.9 (L) 3.5 - 5.1 mmol/L    Chloride 101 97 - 108 mmol/L    CO2 29 21 - 32 mmol/L    Anion gap 5 5 - 15 mmol/L    Glucose 101 (H) 65 - 100 mg/dL    BUN 13 6 - 20 mg/dL    Creatinine 0.68 (L) 0.70 - 1.30 mg/dL    BUN/Creatinine ratio 19 12 - 20      GFR est AA >60 >60 ml/min/1.73m2    GFR est non-AA >60 >60 ml/min/1.73m2    Calcium 8.2 (L) 8.5 - 10.1 mg/dL    Bilirubin, total 0.3 0.2 - 1.0 mg/dL    AST (SGOT) 17 15 - 37 U/L    ALT (SGPT) 34 12 - 78 U/L    Alk. phosphatase 71 45 - 117 U/L    Protein, total 6.7 6.4 - 8.2 g/dL    Albumin 2.5 (L) 3.5 - 5.0 g/dL    Globulin 4.2 (H) 2.0 - 4.0 g/dL    A-G Ratio 0.6 (L) 1.1 - 2.2     MAGNESIUM    Collection Time: 01/15/21  6:29 AM   Result Value Ref Range    Magnesium 1.5 (L) 1.6 - 2.4 mg/dL   CBC WITH AUTOMATED DIFF    Collection Time: 01/15/21  6:29 AM   Result Value Ref Range    WBC 13.6 (H) 4.4 - 11.3 K/uL    RBC 4.28 (L) 4.50 - 5.90 M/uL    HGB 12.1 (L) 13.5 - 17.5 g/dL    HCT 36.7 (L) 41 - 53 %    MCV 85.8 80 - 100 FL    MCH 28.2 (L) 31 - 34 PG    MCHC 32.9 31.0 - 36.0 g/dL    RDW 13.1 11.5 - 14.5 %    PLATELET 273 K/uL    MPV 7.7 6.5 - 11.5 FL    NRBC 0.0  WBC    ABSOLUTE NRBC 0.01 K/uL    NEUTROPHILS 76 (H) 42 - 75 %    LYMPHOCYTES 12 (L) 20.5 - 51.1 %    MONOCYTES 10 (H) 1.7 - 9.3 %    EOSINOPHILS 1 0.9 - 2.9 %    BASOPHILS 1 0.0 - 2.5 %    ABS. NEUTROPHILS 10.5 (H) 1.8 - 7.7 K/UL    ABS. LYMPHOCYTES 1.6 1.0 - 4.8 K/UL    ABS. MONOCYTES 1.3 0.2 - 2.4 K/UL    ABS. EOSINOPHILS 0.1 0.0 - 0.7 K/UL    ABS. BASOPHILS 0.1 0.0 - 0.2 K/UL   METABOLIC PANEL, BASIC    Collection Time: 01/16/21  9:05 AM   Result Value Ref Range    Sodium 137 136 - 145 mmol/L    Potassium 3.5 3.5 - 5.1 mmol/L    Chloride 102 97 - 108 mmol/L    CO2 27 21 - 32 mmol/L    Anion gap 8 5 - 15 mmol/L    Glucose 95 65 - 100 mg/dL    BUN 11 6 - 20 mg/dL    Creatinine 0.67  (L) 0.70 - 1.30 mg/dL    BUN/Creatinine ratio 16 12 - 20      GFR est AA >60 >60 ml/min/1.73m2    GFR est non-AA >60 >60 ml/min/1.73m2    Calcium 8.6 8.5 - 10.1 mg/dL   CBC WITH AUTOMATED DIFF    Collection Time: 01/16/21  9:05 AM   Result Value Ref Range    WBC 10.0 4.4 - 11.3 K/uL    RBC 4.23 (L) 4.50 - 5.90 M/uL    HGB 12.2 (L) 13.5 - 17.5 g/dL    HCT 36.3 (L) 41 - 53 %    MCV 85.9 80 - 100 FL    MCH 28.9 (L) 31 - 34 PG    MCHC 33.6 31.0 - 36.0 g/dL    RDW 12.9 11.5 - 14.5 %    PLATELET 052 K/uL    MPV 7.7 6.5 - 11.5 FL    NRBC 0.0  WBC    ABSOLUTE NRBC 0.00 K/uL    NEUTROPHILS 74 42 - 75 %    LYMPHOCYTES 13 (L) 20.5 - 51.1 %    MONOCYTES 11 (H) 1.7 - 9.3 %    EOSINOPHILS 2 0.9 - 2.9 %    BASOPHILS 0 0.0 - 2.5 %    ABS. NEUTROPHILS 7.3 1.8 - 7.7 K/UL    ABS. LYMPHOCYTES 1.3 1.0 - 4.8 K/UL    ABS. MONOCYTES 1.1 0.2 - 2.4 K/UL    ABS. EOSINOPHILS 0.2 0.0 - 0.7 K/UL    ABS. BASOPHILS 0.0 0.0 - 0.2 K/UL   MAGNESIUM    Collection Time: 01/16/21  9:05 AM   Result Value Ref Range    Magnesium 1.6 1.6 - 2.4 mg/dL   INFLUENZA A & B AG (RAPID TEST)    Collection Time: 01/16/21  1:45 PM   Result Value Ref Range    Influenza A Antigen Negative Negative      Influenza B Antigen Negative Negative          Imaging:  Ct Chest Wo Cont    Result Date: 1/16/2021  IMPRESSION: Emphysema. Posterior bibasilar subsegmental atelectasis. Otherwise, no CT evidence for active infectious/inflammatory process. Prior granulomatous exposure. No pleural or pericardial effusion.         Assessment//Plan           Problem List:  Hospital Problems  Date Reviewed: 9/12/2020          Codes Class Noted POA    UTI (urinary tract infection) due to urinary indwelling Ferrell catheter [facility]) ICD-10-CM: T83.511A, N39.0  ICD-9-CM: 996.64, 599.0  1/14/2021 Unknown        [facility] ICD-10-CM: A41.9  ICD-9-CM: 038.9, 995.91  9/12/2020 Unknown            Sepsis  -Patient presents with tachycardia and leukocytosis meeting sepsis criteria  -Sepsis due to urinary tract infection  -IV fluid per sepsis protocol, hemodynamically stable     Urinary tract infection  -Associated with indwelling Ferrell catheter  -Continue Zosyn  -Cultures x2 shows no growth x48 hours  -Culture is positive for providencia good sensitivity to both penicillin and cephalosporins     Hyponatremia mild  -Given IV fluid in the ER  -R initial sodium was 130 5 repeat on 1/16 is at 137    Hypokalemia  -Was 3.5 on admission did decrease down to 2.9 and and improved to 3.5 after supplementation continue 20 mEq KCl oral daily    Hypomagnesemia  Initially was 1.5 and repeat on 1/16 was at 1.6 so 2 g IV magnesium sulfate rider given  -Repeat mag level in a.m. Coronary artery disease  -Nonobstructive coronary artery disease per records  -Continue aspirin     Quadriplegia  -As a consequence of epidural abscess with cervical laminectomy  -Routine supportive care  -Neurogenic bladder dependent on urinary catheter  -Patient is a resident of [facility] doing rehab or patient has marked improvement in his initial quadriplegia     Sacral ulcer  -Sacral decubitus ulcer present  -Wound care daily with Silvadene     Malnutrition  -Moderate protein calorie malnutrition  -Nutrition consult     DVT prophylaxis  Lovenox daily    Full Code   Disposition: Once patient is afebrile for 24 hours patient can be transferred back to skilled rehab most likely will accept patient back on Monday    Spent 30 minutes evaluting and coordinating patient care of which >50% was spent coordinating and counseling.      Electronically signed by Cihto Rick MD on 1/16/2021 at 2:55 PM

## 2021-01-16 NOTE — ROUTINE PROCESS
Bedside shift change report given to Emilia Brown LPN (oncoming nurse) by Laurell Closs RN (offgoing nurse). Report included the following information SBAR.

## 2021-01-17 LAB
ANION GAP SERPL CALC-SCNC: 7 MMOL/L (ref 5–15)
BACTERIA SPEC CULT: ABNORMAL
BACTERIA SPEC CULT: ABNORMAL
BASOPHILS # BLD: 0.1 K/UL (ref 0–0.2)
BASOPHILS NFR BLD: 1 % (ref 0–2.5)
BUN SERPL-MCNC: 7 MG/DL (ref 6–20)
BUN/CREAT SERPL: 11 (ref 12–20)
CA-I BLD-MCNC: 8.7 MG/DL (ref 8.5–10.1)
CHLORIDE SERPL-SCNC: 101 MMOL/L (ref 97–108)
CO2 SERPL-SCNC: 29 MMOL/L (ref 21–32)
COLONY COUNT,CNT: ABNORMAL
CREAT SERPL-MCNC: 0.62 MG/DL (ref 0.7–1.3)
EOSINOPHIL # BLD: 0.5 K/UL (ref 0–0.7)
EOSINOPHIL NFR BLD: 5 % (ref 0.9–2.9)
ERYTHROCYTE [DISTWIDTH] IN BLOOD BY AUTOMATED COUNT: 12.9 % (ref 11.5–14.5)
GLUCOSE SERPL-MCNC: 88 MG/DL (ref 65–100)
HCT VFR BLD AUTO: 36.8 % (ref 41–53)
HGB BLD-MCNC: 12.3 G/DL (ref 13.5–17.5)
LYMPHOCYTES # BLD: 1.7 K/UL (ref 1–4.8)
LYMPHOCYTES NFR BLD: 20 % (ref 20.5–51.1)
MAGNESIUM SERPL-MCNC: 1.7 MG/DL (ref 1.6–2.4)
MCH RBC QN AUTO: 29 PG (ref 31–34)
MCHC RBC AUTO-ENTMCNC: 33.5 G/DL (ref 31–36)
MCV RBC AUTO: 86.7 FL (ref 80–100)
MONOCYTES # BLD: 1 K/UL (ref 0.2–2.4)
MONOCYTES NFR BLD: 11 % (ref 1.7–9.3)
NEUTS SEG # BLD: 5.3 K/UL (ref 1.8–7.7)
NEUTS SEG NFR BLD: 63 % (ref 42–75)
NRBC # BLD: 0 K/UL
NRBC BLD-RTO: 0 PER 100 WBC
PLATELET # BLD AUTO: 305 K/UL
PMV BLD AUTO: 8 FL (ref 6.5–11.5)
POTASSIUM SERPL-SCNC: 3.7 MMOL/L (ref 3.5–5.1)
RBC # BLD AUTO: 4.24 M/UL (ref 4.5–5.9)
SODIUM SERPL-SCNC: 137 MMOL/L (ref 136–145)
SPECIAL REQUESTS,SREQ: ABNORMAL
WBC # BLD AUTO: 8.5 K/UL (ref 4.4–11.3)

## 2021-01-17 PROCEDURE — 65270000029 HC RM PRIVATE

## 2021-01-17 PROCEDURE — 74011250637 HC RX REV CODE- 250/637: Performed by: HOSPITALIST

## 2021-01-17 PROCEDURE — 74011000258 HC RX REV CODE- 258: Performed by: FAMILY MEDICINE

## 2021-01-17 PROCEDURE — 85025 COMPLETE CBC W/AUTO DIFF WBC: CPT

## 2021-01-17 PROCEDURE — 83735 ASSAY OF MAGNESIUM: CPT

## 2021-01-17 PROCEDURE — 36415 COLL VENOUS BLD VENIPUNCTURE: CPT

## 2021-01-17 PROCEDURE — 74011250636 HC RX REV CODE- 250/636: Performed by: HOSPITALIST

## 2021-01-17 PROCEDURE — 74011250636 HC RX REV CODE- 250/636: Performed by: FAMILY MEDICINE

## 2021-01-17 PROCEDURE — 80048 BASIC METABOLIC PNL TOTAL CA: CPT

## 2021-01-17 RX ORDER — LANOLIN ALCOHOL/MO/W.PET/CERES
400 CREAM (GRAM) TOPICAL 2 TIMES DAILY
Status: DISCONTINUED | OUTPATIENT
Start: 2021-01-17 | End: 2021-01-18 | Stop reason: HOSPADM

## 2021-01-17 RX ORDER — CEPHALEXIN 250 MG/1
500 CAPSULE ORAL 4 TIMES DAILY
Status: DISCONTINUED | OUTPATIENT
Start: 2021-01-17 | End: 2021-01-18 | Stop reason: HOSPADM

## 2021-01-17 RX ADMIN — CEPHALEXIN 500 MG: 250 CAPSULE ORAL at 18:08

## 2021-01-17 RX ADMIN — ENOXAPARIN SODIUM 40 MG: 40 INJECTION SUBCUTANEOUS at 09:25

## 2021-01-17 RX ADMIN — MAGNESIUM OXIDE 400 MG: 400 TABLET ORAL at 12:55

## 2021-01-17 RX ADMIN — GABAPENTIN 300 MG: 300 CAPSULE ORAL at 18:09

## 2021-01-17 RX ADMIN — SILVER SULFADIAZINE: 10 CREAM TOPICAL at 13:03

## 2021-01-17 RX ADMIN — THIAMINE HCL TAB 100 MG 100 MG: 100 TAB at 09:26

## 2021-01-17 RX ADMIN — GABAPENTIN 300 MG: 300 CAPSULE ORAL at 09:26

## 2021-01-17 RX ADMIN — Medication 10 ML: at 21:25

## 2021-01-17 RX ADMIN — MAGNESIUM OXIDE 400 MG: 400 TABLET ORAL at 18:08

## 2021-01-17 RX ADMIN — OXYCODONE 5 MG: 5 TABLET ORAL at 21:31

## 2021-01-17 RX ADMIN — PIPERACILLIN AND TAZOBACTAM 3.38 G: 3; .375 INJECTION, POWDER, LYOPHILIZED, FOR SOLUTION INTRAVENOUS at 03:04

## 2021-01-17 RX ADMIN — OXYCODONE 5 MG: 5 TABLET ORAL at 13:03

## 2021-01-17 RX ADMIN — CLONAZEPAM 0.25 MG: 0.5 TABLET ORAL at 09:26

## 2021-01-17 RX ADMIN — MULTIVITAMIN TABLET 1 TABLET: TABLET at 09:26

## 2021-01-17 RX ADMIN — POTASSIUM CHLORIDE 20 MEQ: 1500 TABLET, EXTENDED RELEASE ORAL at 09:26

## 2021-01-17 RX ADMIN — GABAPENTIN 300 MG: 300 CAPSULE ORAL at 21:24

## 2021-01-17 RX ADMIN — FAMOTIDINE 20 MG: 20 TABLET ORAL at 18:08

## 2021-01-17 RX ADMIN — CEPHALEXIN 500 MG: 250 CAPSULE ORAL at 21:24

## 2021-01-17 RX ADMIN — FAMOTIDINE 20 MG: 20 TABLET ORAL at 09:26

## 2021-01-17 RX ADMIN — CEPHALEXIN 500 MG: 250 CAPSULE ORAL at 12:55

## 2021-01-17 RX ADMIN — OXYCODONE 5 MG: 5 TABLET ORAL at 09:29

## 2021-01-17 RX ADMIN — MIRTAZAPINE 7.5 MG: 15 TABLET, FILM COATED ORAL at 21:25

## 2021-01-17 RX ADMIN — Medication 10 ML: at 13:03

## 2021-01-17 RX ADMIN — Medication 0.5 TABLET: at 09:29

## 2021-01-17 RX ADMIN — CLONAZEPAM 0.25 MG: 0.5 TABLET ORAL at 18:08

## 2021-01-17 RX ADMIN — ASPIRIN 81 MG 81 MG: 81 TABLET ORAL at 09:26

## 2021-01-17 RX ADMIN — TAMSULOSIN HYDROCHLORIDE 0.4 MG: 0.4 CAPSULE ORAL at 09:26

## 2021-01-17 NOTE — PROGRESS NOTES
Bedside shift change report given to Jeremy Kessler  (oncoming nurse) by Shyam Hutson (offgoing nurse). Report included the following information SBAR.

## 2021-01-17 NOTE — PROGRESS NOTES
Progress Note  Date:1/17/2021       Room:[facility]  Jeremiah Holder     YOB: 1965     Age:55 y.o. Subjective    This is a 60-year-old male with a history of alcohol and drug abuse, tobacco use, MSSA bacteremia secondary to epidural abscess status post cervical laminectomy now quadriplegic status post trach trach placement and PEG placement decannulation with a chronic Ferrell catheter secondary nursing facility when he was tolerating having fever. In the emergency the patient was found to have a Ferrell catheter which was not replaced for a long time this was replaced in the emergency with focal urine output, in the 90s the patient was found to be tachycardic with leukocytosis and temperature of 100 and abnormal UA. Given the patient's history and sepsis-like picture hospitalist was called to admit the patient for IV antibiotics. The patient was given levofloxacin in the emergency  During my exam the patient is alert and oriented answering all questions appropriately. Denies headache, LOC, seizures, lethargy, weakness. no Chest pain, SOB, palpitation, cough or fever, no abdominal pain, nausea or vomiting. no bowel or bladder issues, no extremity swelling, no focal weakness, no recent travel, taking medications regulary, no sick contacts    Seen and evaluated. Afebrile, doing well. Patient has UC good sensitivity and change to oral keflex . Also has improvement with neck pain after restarting home pain med     Review of Systems   Constitutional: Negative for chills, diaphoresis, fatigue and fever. HENT: Negative for congestion, ear pain, postnasal drip, sinus pain and sore throat. Eyes: Negative for pain, discharge and redness. Respiratory: Negative for cough, shortness of breath and wheezing. Cardiovascular: Negative for chest pain and palpitations. Gastrointestinal: Negative for abdominal pain, constipation, diarrhea, nausea and vomiting.    Genitourinary: Negative for dysuria, flank pain, frequency and urgency. Musculoskeletal: Negative for arthralgias and myalgias. Neurological: Negative for dizziness, weakness and headaches. Psychiatric/Behavioral: Negative for agitation and hallucinations. The patient is not nervous/anxious. Objective           Vitals Last 24 Hours:  Patient Vitals for the past 24 hrs:   Temp Pulse Resp BP SpO2   01/17/21 0800  80      01/17/21 0754 99.3 °F (37.4 °C) 85 18 102/67 94 %   01/17/21 0430 98.8 °F (37.1 °C) 85 18 108/68 94 %   01/16/21 2322 99.9 °F (37.7 °C) 77 18 116/78 94 %   01/16/21 1919 100.2 °F (37.9 °C) 78 20 108/70 94 %   01/16/21 1622 99 °F (37.2 °C) 95 20 109/73 95 %   01/16/21 1600  86      01/16/21 1204 96.8 °F (36 °C) 78 20 113/78 97 %   01/16/21 1200  82           I/O (24Hr): Intake/Output Summary (Last 24 hours) at 1/17/2021 1109  Last data filed at 1/16/2021 2324  Gross per 24 hour   Intake    Output 1300 ml   Net -1300 ml       Physical Exam:  General: Alert, cooperative, no distress, appears stated age. Head:  Normocephalic, without obvious abnormality, atraumatic. Eyes:  Conjunctivae/corneas clear. Pupils equal, round, reactive to light. Extraocular movements intact. Lungs:  Clear to auscultation bilaterally. no wheeze, rales, crackles, rhonchi   Chest wall: No tenderness or deformity. Heart:  Regular rate and rhythm, S1, S2 normal, no murmur, click, rub or gallop. Abdomen:  Soft, non-tender. Bowel sounds normal. No masses,  No organomegaly. Extremities: Extremities normal, atraumatic, no cyanosis or edema. Pulses: 2+ and symmetric all extremities. Skin: Skin color, texture, turgor normal. No rashes or lesions  Neurologic: Awake, Alert, oriented.  No obvious gross sensory or motor deficits      Medications           Current Facility-Administered Medications   Medication Dose Route Frequency    cephALEXin (KEFLEX) capsule 500 mg  500 mg Oral QID    potassium chloride (K-DUR, KLOR-CON) SR tablet 20 mEq 20 mEq Oral DAILY    oxyCODONE IR (ROXICODONE) tablet 5 mg  5 mg Oral Q4H PRN    mirtazapine (REMERON) tablet 7.5 mg  7.5 mg Oral QHS    albuterol-ipratropium (DUO-NEB) 2.5 MG-0.5 MG/3 ML  3 mL Nebulization Q6H PRN    silver sulfADIAZINE (SILVADENE) 1 % topical cream   Topical DAILY    lidocaine (XYLOCAINE) 2 % viscous solution 15 mL  15 mL Mouth/Throat PRN    acetaminophen (TYLENOL) tablet 650 mg  650 mg Oral Q6H PRN    Or    acetaminophen (TYLENOL) suppository 650 mg  650 mg Rectal Q6H PRN    polyethylene glycol (MIRALAX) packet 17 g  17 g Oral DAILY PRN    promethazine (PHENERGAN) tablet 12.5 mg  12.5 mg Oral Q6H PRN    Or    ondansetron (ZOFRAN) injection 4 mg  4 mg IntraVENous Q6H PRN    enoxaparin (LOVENOX) injection 40 mg  40 mg SubCUTAneous DAILY    famotidine (PEPCID) tablet 20 mg  20 mg Oral BID    sodium chloride (NS) flush 5-40 mL  5-40 mL IntraVENous Q8H    sodium chloride (NS) flush 5-40 mL  5-40 mL IntraVENous PRN    morphine 10 mg/ml injection 2 mg  2 mg IntraVENous Q4H PRN    naloxone (NARCAN) injection 0.4 mg  0.4 mg IntraVENous PRN    aspirin chewable tablet 81 mg  81 mg Oral DAILY    cholecalciferol (VITAMIN D3) (1000 Units /25 mcg) tablet 0.5 Tab  500 Units Oral DAILY    clonazePAM (KlonoPIN) tablet 0.25 mg  0.25 mg Oral BID    cyclobenzaprine (FLEXERIL) tablet 5 mg  5 mg Oral Q8H PRN    gabapentin (NEURONTIN) capsule 300 mg  300 mg Oral TID    multivitamin (ONE A DAY) tablet 1 Tab  1 Tab Oral DAILY AFTER BREAKFAST    tamsulosin (FLOMAX) capsule 0.4 mg  0.4 mg Oral DAILY    thiamine mononitrate (B-1) tablet 100 mg  100 mg Oral DAILY         Allergies         Patient has no known allergies.        Labs/Imaging/Diagnostics      Labs:  Recent Results (from the past 48 hour(s))   METABOLIC PANEL, BASIC    Collection Time: 01/16/21  9:05 AM   Result Value Ref Range    Sodium 137 136 - 145 mmol/L    Potassium 3.5 3.5 - 5.1 mmol/L    Chloride 102 97 - 108 mmol/L    CO2 27 21 - 32 mmol/L    Anion gap 8 5 - 15 mmol/L    Glucose 95 65 - 100 mg/dL    BUN 11 6 - 20 mg/dL    Creatinine 0.67 (L) 0.70 - 1.30 mg/dL    BUN/Creatinine ratio 16 12 - 20      GFR est AA >60 >60 ml/min/1.73m2    GFR est non-AA >60 >60 ml/min/1.73m2    Calcium 8.6 8.5 - 10.1 mg/dL   CBC WITH AUTOMATED DIFF    Collection Time: 01/16/21  9:05 AM   Result Value Ref Range    WBC 10.0 4.4 - 11.3 K/uL    RBC 4.23 (L) 4.50 - 5.90 M/uL    HGB 12.2 (L) 13.5 - 17.5 g/dL    HCT 36.3 (L) 41 - 53 %    MCV 85.9 80 - 100 FL    MCH 28.9 (L) 31 - 34 PG    MCHC 33.6 31.0 - 36.0 g/dL    RDW 12.9 11.5 - 14.5 %    PLATELET 195 K/uL    MPV 7.7 6.5 - 11.5 FL    NRBC 0.0  WBC    ABSOLUTE NRBC 0.00 K/uL    NEUTROPHILS 74 42 - 75 %    LYMPHOCYTES 13 (L) 20.5 - 51.1 %    MONOCYTES 11 (H) 1.7 - 9.3 %    EOSINOPHILS 2 0.9 - 2.9 %    BASOPHILS 0 0.0 - 2.5 %    ABS. NEUTROPHILS 7.3 1.8 - 7.7 K/UL    ABS. LYMPHOCYTES 1.3 1.0 - 4.8 K/UL    ABS. MONOCYTES 1.1 0.2 - 2.4 K/UL    ABS. EOSINOPHILS 0.2 0.0 - 0.7 K/UL    ABS.  BASOPHILS 0.0 0.0 - 0.2 K/UL   MAGNESIUM    Collection Time: 01/16/21  9:05 AM   Result Value Ref Range    Magnesium 1.6 1.6 - 2.4 mg/dL   INFLUENZA A & B AG (RAPID TEST)    Collection Time: 01/16/21  1:45 PM   Result Value Ref Range    Influenza A Antigen Negative Negative      Influenza B Antigen Negative Negative     MAGNESIUM    Collection Time: 01/17/21  6:05 AM   Result Value Ref Range    Magnesium 1.7 1.6 - 2.4 mg/dL   CBC WITH AUTOMATED DIFF    Collection Time: 01/17/21  6:05 AM   Result Value Ref Range    WBC 8.5 4.4 - 11.3 K/uL    RBC 4.24 (L) 4.50 - 5.90 M/uL    HGB 12.3 (L) 13.5 - 17.5 g/dL    HCT 36.8 (L) 41 - 53 %    MCV 86.7 80 - 100 FL    MCH 29.0 (L) 31 - 34 PG    MCHC 33.5 31.0 - 36.0 g/dL    RDW 12.9 11.5 - 14.5 %    PLATELET 179 K/uL    MPV 8.0 6.5 - 11.5 FL    NRBC 0.0  WBC    ABSOLUTE NRBC 0.00 K/uL    NEUTROPHILS 63 42 - 75 %    LYMPHOCYTES 20 (L) 20.5 - 51.1 %    MONOCYTES 11 (H) 1.7 - 9.3 % EOSINOPHILS 5 (H) 0.9 - 2.9 %    BASOPHILS 1 0.0 - 2.5 %    ABS. NEUTROPHILS 5.3 1.8 - 7.7 K/UL    ABS. LYMPHOCYTES 1.7 1.0 - 4.8 K/UL    ABS. MONOCYTES 1.0 0.2 - 2.4 K/UL    ABS. EOSINOPHILS 0.5 0.0 - 0.7 K/UL    ABS. BASOPHILS 0.1 0.0 - 0.2 K/UL   METABOLIC PANEL, BASIC    Collection Time: 01/17/21  6:05 AM   Result Value Ref Range    Sodium 137 136 - 145 mmol/L    Potassium 3.7 3.5 - 5.1 mmol/L    Chloride 101 97 - 108 mmol/L    CO2 29 21 - 32 mmol/L    Anion gap 7 5 - 15 mmol/L    Glucose 88 65 - 100 mg/dL    BUN 7 6 - 20 mg/dL    Creatinine 0.62 (L) 0.70 - 1.30 mg/dL    BUN/Creatinine ratio 11 (L) 12 - 20      GFR est AA >60 >60 ml/min/1.73m2    GFR est non-AA >60 >60 ml/min/1.73m2    Calcium 8.7 8.5 - 10.1 mg/dL        Imaging:  Ct Chest Wo Cont    Result Date: 1/16/2021  IMPRESSION: Emphysema. Posterior bibasilar subsegmental atelectasis. Otherwise, no CT evidence for active infectious/inflammatory process. Prior granulomatous exposure. No pleural or pericardial effusion.         Assessment//Plan           Problem List:  Hospital Problems  Date Reviewed: 9/12/2020          Codes Class Noted POA    UTI (urinary tract infection) due to urinary indwelling Ferrell catheter [facility]) ICD-10-CM: T83.511A, N39.0  ICD-9-CM: 996.64, 599.0  1/14/2021 Unknown        [facility] ICD-10-CM: A41.9  ICD-9-CM: 038.9, 995.91  9/12/2020 Unknown            Sepsis  -Patient presents with tachycardia and leukocytosis meeting sepsis criteria  -Sepsis due to urinary tract infection  -IV fluid per sepsis protocol, hemodynamically stable  - BC x 2 NG x 3 days      Urinary tract infection  -Associated with indwelling Ferrell catheter  -Continue Zosyn  -Cultures x2 shows no growth x48 hours  -Culture is positive for providencia good sensitivity to both penicillin and cephalosporins  - dc zosyn on 1/17 and start keflex 500mg oral qid x 7 days      Hyponatremia mild  -Given IV fluid in the ER  -R initial sodium was 130 5 repeat on 1/16 is at 137    Hypokalemia  -Was 3.5 on admission did decrease down to 2.9 and and improved to 3.5 after supplementation continue 20 mEq KCl oral daily and repeat on 1/17 stable at  3.7    Hypomagnesemia  Initially was 1.5 and repeat on 1/16 was at 1.6 so 2 g IV magnesium sulfate rider given and repeat on 1/17 is at 1.7 so start mag oxide 400mg oral bid . Coronary artery disease  -Nonobstructive coronary artery disease per records  -Continue aspirin     Quadriplegia  -As a consequence of epidural abscess with cervical laminectomy  -Routine supportive care  -Neurogenic bladder dependent on urinary catheter  -Patient is a resident of [facility] doing rehab or patient has marked improvement in his initial quadriplegia     Sacral ulcer  -Sacral decubitus ulcer present  -Wound care daily with Silvadene     Malnutrition  -Moderate protein calorie malnutrition  -Nutrition consult in AM      DVT prophylaxis  Lovenox daily    Full Code   Disposition: possible [email or web address] in AM     Spent 30 minutes evaluting and coordinating patient care of which >50% was spent coordinating and counseling.      Electronically signed by Renuka Iglesias MD on 1/17/2021 at 2:55 PM

## 2021-01-17 NOTE — PROGRESS NOTES
Problem: Falls - Risk of  Goal: *Absence of Falls  Description: Document Londono Reason Fall Risk and appropriate interventions in the flowsheet. Outcome: Progressing Towards Goal  Note: Fall Risk Interventions:            Medication Interventions: Patient to call before getting OOB    Elimination Interventions: Call light in reach              Problem: Patient Education: Go to Patient Education Activity  Goal: Patient/Family Education  Outcome: Progressing Towards Goal     Problem: Pressure Injury - Risk of  Goal: *Prevention of pressure injury  Description: Document Brayden Scale and appropriate interventions in the flowsheet.   Outcome: Progressing Towards Goal  Note: Pressure Injury Interventions:  Sensory Interventions: Maintain/enhance activity level    Moisture Interventions: Minimize layers         Mobility Interventions: Float heels    Nutrition Interventions: Document food/fluid/supplement intake    Friction and Shear Interventions: Minimize layers                Problem: Patient Education: Go to Patient Education Activity  Goal: Patient/Family Education  Outcome: Progressing Towards Goal

## 2021-01-18 VITALS
SYSTOLIC BLOOD PRESSURE: 98 MMHG | TEMPERATURE: 98.3 F | DIASTOLIC BLOOD PRESSURE: 69 MMHG | BODY MASS INDEX: 17.92 KG/M2 | WEIGHT: 132.28 LBS | OXYGEN SATURATION: 94 % | HEIGHT: 72 IN | HEART RATE: 91 BPM | RESPIRATION RATE: 20 BRPM

## 2021-01-18 LAB
ANION GAP SERPL CALC-SCNC: 10 MMOL/L (ref 5–15)
BASOPHILS # BLD: 0 K/UL (ref 0–0.2)
BASOPHILS NFR BLD: 1 % (ref 0–2.5)
BUN SERPL-MCNC: 9 MG/DL (ref 6–20)
BUN/CREAT SERPL: 13 (ref 12–20)
CA-I BLD-MCNC: 9.3 MG/DL (ref 8.5–10.1)
CHLORIDE SERPL-SCNC: 98 MMOL/L (ref 97–108)
CO2 SERPL-SCNC: 28 MMOL/L (ref 21–32)
COVID-19 RAPID TEST, COVR: NOT DETECTED
CREAT SERPL-MCNC: 0.72 MG/DL (ref 0.7–1.3)
EOSINOPHIL # BLD: 0.5 K/UL (ref 0–0.7)
EOSINOPHIL NFR BLD: 7 % (ref 0.9–2.9)
ERYTHROCYTE [DISTWIDTH] IN BLOOD BY AUTOMATED COUNT: 12.8 % (ref 11.5–14.5)
GLUCOSE SERPL-MCNC: 93 MG/DL (ref 65–100)
HCT VFR BLD AUTO: 39.4 % (ref 41–53)
HGB BLD-MCNC: 13.3 G/DL (ref 13.5–17.5)
LYMPHOCYTES # BLD: 1.6 K/UL (ref 1–4.8)
LYMPHOCYTES NFR BLD: 21 % (ref 20.5–51.1)
MCH RBC QN AUTO: 29.1 PG (ref 31–34)
MCHC RBC AUTO-ENTMCNC: 33.8 G/DL (ref 31–36)
MCV RBC AUTO: 85.9 FL (ref 80–100)
MONOCYTES # BLD: 0.8 K/UL (ref 0.2–2.4)
MONOCYTES NFR BLD: 10 % (ref 1.7–9.3)
NEUTS SEG # BLD: 4.8 K/UL (ref 1.8–7.7)
NEUTS SEG NFR BLD: 61 % (ref 42–75)
NRBC # BLD: 0.01 K/UL
NRBC BLD-RTO: 10 PER 100 WBC
PLATELET # BLD AUTO: 370 K/UL
PMV BLD AUTO: 7.6 FL (ref 6.5–11.5)
POTASSIUM SERPL-SCNC: 4.1 MMOL/L (ref 3.5–5.1)
RBC # BLD AUTO: 4.58 M/UL (ref 4.5–5.9)
SARS-COV-2, COV2: NORMAL
SODIUM SERPL-SCNC: 136 MMOL/L (ref 136–145)
SPECIMEN SOURCE: NORMAL
WBC # BLD AUTO: 7.7 K/UL (ref 4.4–11.3)

## 2021-01-18 PROCEDURE — 87635 SARS-COV-2 COVID-19 AMP PRB: CPT

## 2021-01-18 PROCEDURE — 74011250636 HC RX REV CODE- 250/636: Performed by: HOSPITALIST

## 2021-01-18 PROCEDURE — 74011250637 HC RX REV CODE- 250/637: Performed by: HOSPITALIST

## 2021-01-18 PROCEDURE — 36415 COLL VENOUS BLD VENIPUNCTURE: CPT

## 2021-01-18 PROCEDURE — 85025 COMPLETE CBC W/AUTO DIFF WBC: CPT

## 2021-01-18 PROCEDURE — 80048 BASIC METABOLIC PNL TOTAL CA: CPT

## 2021-01-18 RX ORDER — CEPHALEXIN 500 MG/1
500 CAPSULE ORAL 4 TIMES DAILY
Qty: 28 CAP | Refills: 0 | Status: SHIPPED | OUTPATIENT
Start: 2021-01-18 | End: 2021-01-18

## 2021-01-18 RX ORDER — CEFDINIR 300 MG/1
300 CAPSULE ORAL 2 TIMES DAILY
Qty: 10 CAP | Refills: 0 | Status: SHIPPED | OUTPATIENT
Start: 2021-01-18 | End: 2021-04-25

## 2021-01-18 RX ORDER — SILVER SULFADIAZINE 10 G/1000G
CREAM TOPICAL DAILY
Qty: 50 G | Refills: 0 | Status: SHIPPED | OUTPATIENT
Start: 2021-01-19

## 2021-01-18 RX ADMIN — Medication 0.5 TABLET: at 08:46

## 2021-01-18 RX ADMIN — TAMSULOSIN HYDROCHLORIDE 0.4 MG: 0.4 CAPSULE ORAL at 08:46

## 2021-01-18 RX ADMIN — MULTIVITAMIN TABLET 1 TABLET: TABLET at 08:46

## 2021-01-18 RX ADMIN — CLONAZEPAM 0.25 MG: 0.5 TABLET ORAL at 08:45

## 2021-01-18 RX ADMIN — SILVER SULFADIAZINE: 10 CREAM TOPICAL at 08:44

## 2021-01-18 RX ADMIN — ASPIRIN 81 MG 81 MG: 81 TABLET ORAL at 08:46

## 2021-01-18 RX ADMIN — FAMOTIDINE 20 MG: 20 TABLET ORAL at 08:46

## 2021-01-18 RX ADMIN — Medication 10 ML: at 05:39

## 2021-01-18 RX ADMIN — ENOXAPARIN SODIUM 40 MG: 40 INJECTION SUBCUTANEOUS at 08:45

## 2021-01-18 RX ADMIN — CEPHALEXIN 500 MG: 250 CAPSULE ORAL at 08:45

## 2021-01-18 RX ADMIN — GABAPENTIN 300 MG: 300 CAPSULE ORAL at 08:45

## 2021-01-18 RX ADMIN — POTASSIUM CHLORIDE 20 MEQ: 1500 TABLET, EXTENDED RELEASE ORAL at 08:46

## 2021-01-18 RX ADMIN — MAGNESIUM OXIDE 400 MG: 400 TABLET ORAL at 08:46

## 2021-01-18 RX ADMIN — THIAMINE HCL TAB 100 MG 100 MG: 100 TAB at 08:45

## 2021-01-18 NOTE — DISCHARGE SUMMARY
Discharge Summary       PATIENT ID: Richard Worrell  MRN: [id number]   YOB: 1965    DATE OF ADMISSION: 1/14/2021 12:07 AM    DATE OF DISCHARGE: 01/18/2021   PRIMARY CARE PROVIDER: Vanessa Hadley MD     ATTENDING PHYSICIAN: Kd Porter  DISCHARGING PROVIDER: Melissa Lovell MD    To contact this individual call [phone] and ask the  to page. If unavailable ask to be transferred the Adult Hospitalist Department. CONSULTATIONS: None    PROCEDURES/SURGERIES: * No surgery found *    ADMITTING DIAGNOSES & HOSPITAL COURSE:     HPI  This is a 52-year-old male with a history of alcohol and drug abuse, tobacco use, MSSA bacteremia secondary to epidural abscess status post cervical laminectomy now quadriplegic status post trach trach placement and PEG placement decannulation with a chronic Ferrell catheter secondary nursing facility when he was tolerating having fever. In the emergency the patient was found to have a Ferrell catheter which was not replaced for a long time this was replaced in the emergency with focal urine output, in the 90s the patient was found to be tachycardic with leukocytosis and temperature of 100 and abnormal UA. Given the patient's history and sepsis-like picture hospitalist was called to admit the patient for IV antibiotics. The patient was given levofloxacin in the emergency department. Hospital course  1. Sepsis  Patient presents with tachycardia and leukocytosis meeting sepsis criteria. Sepsis due to UTI. Patient remains hemodynamically stable. Blood cultures were negative. 2. Urinary tract infection  Associated with indwelling Ferrell catheter. Patient continued on Zosyn during this hospitalization. Patient's urine culture back with procidentia. Sensitive to ceftriaxone. Patient will be discharged to nursing home to complete 5 more days of Omnicef. 3. Hyponatremia  Patient has received IV fluid and sodium level has improved.     4. Quadriplegia  Patient is currently undergoing physical therapy treatment at the nursing home. His quadriplegia is a consequence of epidural abscess with cervical laminectomy. DISCHARGE DIAGNOSES / PLAN:      1. Sepsis  2. Urinary tract infection  3. Hyponatremia  4. Quadriplegia  5. Coronary artery disease  6. Sacral ulcer  7. Malnutrition     ADDITIONAL CARE RECOMMENDATIONS:     None    PENDING TEST RESULTS:   At the time of discharge the following test results are still pending: None    FOLLOW UP APPOINTMENTS:    Follow-up Information     Follow up With Specialties Details Why Contact Info    Emile Gates MD [facility]   [address] [place] [address]  [phone]               DIET: Cardiac Diet  Oral Nutritional Supplements: No Oral Supplement prescribed    ACTIVITY: Activity as tolerated    WOUND CARE: None    EQUIPMENT needed: None      DISCHARGE MEDICATIONS:  Current Discharge Medication List      START taking these medications    Details   cephALEXin (KEFLEX) 500 mg capsule Take 1 Cap by mouth four (4) times daily. Qty: 28 Cap, Refills: 0      silver sulfADIAZINE (SILVADENE) 1 % topical cream Apply  to affected area daily. Qty: 50 g, Refills: 0         CONTINUE these medications which have NOT CHANGED    Details   docusate sodium (COLACE) 100 mg capsule Take 100 mg by mouth two (2) times daily as needed for Constipation. mirtazapine (REMERON) 7.5 mg tablet Take 7.5 mg by mouth nightly. oxyCODONE IR (ROXICODONE) 5 mg immediate release tablet Take 5 mg by mouth every four (4) hours as needed for Pain. zinc sulfate (ZINCATE) 220 (50) mg capsule Take 220 mg by mouth daily. acetaminophen (TYLENOL) 325 mg tablet Take 650 mg by mouth every four (4) hours as needed. aspirin 81 mg chewable tablet Take 81 mg by mouth daily. cholecalciferol (VITAMIN D3) (1000 Units /25 mcg) tablet Take 500 Units by mouth daily. clonazePAM (KlonoPIN) 0.5 mg tablet Take 0.25 mg by mouth two (2) times a day. cyclobenzaprine (FLEXERIL) 5 mg tablet Take 5 mg by mouth every eight (8) hours as needed. enoxaparin (LOVENOX) 40 mg/0.4 mL 40 mg by SubCUTAneous route. famotidine (PEPCID) 20 mg tablet Take 20 mg by mouth two (2) times a day.      gabapentin (NEURONTIN) 300 mg capsule Take 300 mg by mouth three (3) times daily. albuterol-ipratropium (DUO-NEB) 2.5 mg-0.5 mg/3 ml nebu Take 3 mL by inhalation every six (6) hours as needed. thiamine HCL (B-1) 100 mg tablet Take 200 mg by mouth daily. tamsulosin (FLOMAX) 0.4 mg capsule Take 0.4 mg by mouth.      multivit-min-iron-folic-vit K1 8 TL-065 mcg- 10 mcg chew Take 1 Tab by mouth daily. Addendum to discharge medication  -Discontinue Keflex  -Start Omnicef 300 mg p.o. twice daily for 5 days      NOTIFY YOUR PHYSICIAN FOR ANY OF THE FOLLOWING:   Fever over 101 degrees for 24 hours. Chest pain, shortness of breath, fever, chills, nausea, vomiting, diarrhea, change in mentation, falling, weakness, bleeding. Severe pain or pain not relieved by medications. Or, any other signs or symptoms that you may have questions about. DISPOSITION:    Home With:   OT  PT  HH  RN       Long term SNF/Inpatient Rehab    Independent/assisted living    Hospice    Other:       PATIENT CONDITION AT DISCHARGE:     Functional status    Poor     Deconditioned     Independent      Cognition     Lucid     Forgetful     Dementia      Catheters/lines (plus indication)    Ferrell     PICC     PEG     None      Code status     Full code     DNR      PHYSICAL EXAMINATION AT DISCHARGE:  General:          Alert, cooperative, no distress, appears stated age. HEENT:           Atraumatic, anicteric sclerae, pink conjunctivae                          No oral ulcers, mucosa moist, throat clear, dentition fair  Neck:               Supple, symmetrical  Lungs:             Clear to auscultation bilaterally. No Wheezing or Rhonchi. No rales.   Chest wall:      No tenderness  No Accessory muscle use. Heart:              Regular  rhythm,  No  murmur   No edema  Abdomen:        Soft, non-tender. Not distended. Bowel sounds normal  Extremities:     No cyanosis. No clubbing,                            Skin turgor normal, Capillary refill normal  Skin:                Not pale. Not Jaundiced  No rashes   Psych:             Not anxious or agitated.   Neurologic:      Alert, moves all extremities, answers questions appropriately and responds to commands       CHRONIC MEDICAL DIAGNOSES:  Problem List as of 1/18/2021 Date Reviewed: 9/12/2020          Codes Class Noted - Resolved    UTI (urinary tract infection) due to urinary indwelling Ferrell catheter [facility]) ICD-10-CM: T83.511A, N39.0  ICD-9-CM: 996.64, 599.0  1/14/2021 - Present        Sepsis ([facility] [address] ICD-10-CM: A41.9  ICD-9-CM: 038.9, 995.91  9/12/2020 - Present        UTI (urinary tract infection) ICD-10-CM: N39.0  ICD-9-CM: 599.0  9/12/2020 - Present        Anxiety ICD-10-CM: F41.9  ICD-9-CM: 300.00  7/20/2020 - Present        Spinal cord abscess ICD-10-CM: G06.1  ICD-9-CM: 324.1  4/8/2020 - Present        Alcoholism ([facility] [address] ICD-10-CM: F10.20  ICD-9-CM: 303.90  3/17/2020 - Present        Tobacco abuse ICD-10-CM: Z72.0  ICD-9-CM: 305.1  3/17/2020 - Present              Greater than 60 minutes were spent with the patient on counseling and coordination of care    Signed:   Charli Mitchell MD  1/18/2021  11:09 AM

## 2021-01-18 NOTE — ROUTINE PROCESS
Bedside shift change report ten to lisa pham (oncoming nurse) by Desire Kohler (offgoing nurse). Report included the following information SBAR.

## 2021-01-18 NOTE — PROGRESS NOTES
Faxed clinical to [facility], per Elva Numbers, pt will return to room 202.  Waiting on discharge summary to fax

## 2021-01-19 LAB
BACTERIA SPEC CULT: NORMAL
BACTERIA SPEC CULT: NORMAL
SPECIAL REQUESTS,SREQ: NORMAL
SPECIAL REQUESTS,SREQ: NORMAL

## 2021-04-21 ENCOUNTER — APPOINTMENT (OUTPATIENT)
Dept: GENERAL RADIOLOGY | Age: 56
End: 2021-04-21
Attending: EMERGENCY MEDICINE
Payer: MEDICAID

## 2021-04-21 ENCOUNTER — HOSPITAL ENCOUNTER (OUTPATIENT)
Age: 56
Setting detail: OBSERVATION
Discharge: NURSING, REHAB OR LONG-TERM CARE | End: 2021-04-25
Attending: EMERGENCY MEDICINE | Admitting: INTERNAL MEDICINE
Payer: MEDICAID

## 2021-04-21 DIAGNOSIS — R31.9 URINARY TRACT INFECTION WITH HEMATURIA, SITE UNSPECIFIED: Primary | ICD-10-CM

## 2021-04-21 DIAGNOSIS — N39.0 URINARY TRACT INFECTION WITH HEMATURIA, SITE UNSPECIFIED: Primary | ICD-10-CM

## 2021-04-21 LAB
ALBUMIN SERPL-MCNC: 3 G/DL (ref 3.5–5)
ALBUMIN/GLOB SERPL: 0.6 {RATIO} (ref 1.1–2.2)
ALP SERPL-CCNC: 72 U/L (ref 45–117)
ALT SERPL-CCNC: 25 U/L (ref 12–78)
ANION GAP SERPL CALC-SCNC: 11 MMOL/L (ref 5–15)
AST SERPL W P-5'-P-CCNC: 17 U/L (ref 15–37)
BASOPHILS # BLD: 0 K/UL (ref 0–0.2)
BASOPHILS NFR BLD: 0 % (ref 0–2.5)
BILIRUB SERPL-MCNC: 0.5 MG/DL (ref 0.2–1)
BUN SERPL-MCNC: 19 MG/DL (ref 6–20)
BUN/CREAT SERPL: 21 (ref 12–20)
CA-I BLD-MCNC: 8.3 MG/DL (ref 8.5–10.1)
CHLORIDE SERPL-SCNC: 96 MMOL/L (ref 97–108)
CO2 SERPL-SCNC: 27 MMOL/L (ref 21–32)
CREAT SERPL-MCNC: 0.91 MG/DL (ref 0.7–1.3)
EOSINOPHIL # BLD: 0 K/UL (ref 0–0.7)
EOSINOPHIL NFR BLD: 0 % (ref 0.9–2.9)
ERYTHROCYTE [DISTWIDTH] IN BLOOD BY AUTOMATED COUNT: 13.9 % (ref 11.5–14.5)
GLOBULIN SER CALC-MCNC: 5 G/DL (ref 2–4)
GLUCOSE SERPL-MCNC: 118 MG/DL (ref 65–100)
HCT VFR BLD AUTO: 37 % (ref 41–53)
HGB BLD-MCNC: 12.7 G/DL (ref 13.5–17.5)
LYMPHOCYTES # BLD: 1.9 K/UL (ref 1–4.8)
LYMPHOCYTES NFR BLD: 14 % (ref 20.5–51.1)
MCH RBC QN AUTO: 29.4 PG (ref 31–34)
MCHC RBC AUTO-ENTMCNC: 34.3 G/DL (ref 31–36)
MCV RBC AUTO: 85.6 FL (ref 80–100)
MONOCYTES # BLD: 2 K/UL (ref 0.2–2.4)
MONOCYTES NFR BLD: 15 % (ref 1.7–9.3)
NEUTS SEG # BLD: 9.6 K/UL (ref 1.8–7.7)
NEUTS SEG NFR BLD: 71 % (ref 42–75)
NRBC # BLD: 0 K/UL
NRBC BLD-RTO: 0 PER 100 WBC
PLATELET # BLD AUTO: 274 K/UL (ref 150–400)
PMV BLD AUTO: 7.8 FL (ref 6.5–11.5)
POTASSIUM SERPL-SCNC: 3.2 MMOL/L (ref 3.5–5.1)
PROT SERPL-MCNC: 8 G/DL (ref 6.4–8.2)
RBC # BLD AUTO: 4.32 M/UL (ref 4.5–5.9)
SODIUM SERPL-SCNC: 134 MMOL/L (ref 136–145)
WBC # BLD AUTO: 13.6 K/UL (ref 4.4–11.3)

## 2021-04-21 PROCEDURE — 85025 COMPLETE CBC W/AUTO DIFF WBC: CPT

## 2021-04-21 PROCEDURE — 71045 X-RAY EXAM CHEST 1 VIEW: CPT

## 2021-04-21 PROCEDURE — 83605 ASSAY OF LACTIC ACID: CPT

## 2021-04-21 PROCEDURE — 74011250637 HC RX REV CODE- 250/637: Performed by: EMERGENCY MEDICINE

## 2021-04-21 PROCEDURE — 87040 BLOOD CULTURE FOR BACTERIA: CPT

## 2021-04-21 PROCEDURE — 80053 COMPREHEN METABOLIC PANEL: CPT

## 2021-04-21 PROCEDURE — 36415 COLL VENOUS BLD VENIPUNCTURE: CPT

## 2021-04-21 PROCEDURE — 99285 EMERGENCY DEPT VISIT HI MDM: CPT

## 2021-04-21 RX ORDER — SODIUM CHLORIDE 9 MG/ML
150 INJECTION, SOLUTION INTRAVENOUS ONCE
Status: COMPLETED | OUTPATIENT
Start: 2021-04-21 | End: 2021-04-22

## 2021-04-21 RX ORDER — IBUPROFEN 800 MG/1
800 TABLET ORAL
Status: COMPLETED | OUTPATIENT
Start: 2021-04-21 | End: 2021-04-21

## 2021-04-21 RX ORDER — ACETAMINOPHEN 500 MG
1000 TABLET ORAL
Status: COMPLETED | OUTPATIENT
Start: 2021-04-21 | End: 2021-04-21

## 2021-04-21 RX ADMIN — IBUPROFEN 800 MG: 800 TABLET, FILM COATED ORAL at 23:30

## 2021-04-21 RX ADMIN — ACETAMINOPHEN 1000 MG: 500 TABLET ORAL at 23:30

## 2021-04-22 LAB
AMORPH CRY URNS QL MICRO: ABNORMAL
APPEARANCE UR: CLEAR
BACTERIA URNS QL MICRO: ABNORMAL /HPF
BILIRUB UR QL: NEGATIVE
COLOR UR: ABNORMAL
GLUCOSE UR STRIP.AUTO-MCNC: NEGATIVE MG/DL
HGB UR QL STRIP: ABNORMAL
KETONES UR QL STRIP.AUTO: NEGATIVE MG/DL
LACTATE SERPL-SCNC: 0.6 MMOL/L (ref 0.4–2)
LEUKOCYTE ESTERASE UR QL STRIP.AUTO: ABNORMAL
NITRITE UR QL STRIP.AUTO: NEGATIVE
PH UR STRIP: 8 [PH] (ref 5–8)
PROT UR STRIP-MCNC: 100 MG/DL
RBC #/AREA URNS HPF: >100 /HPF (ref 0–3)
SP GR UR REFRACTOMETRY: 1.01 (ref 1–1.03)
UROBILINOGEN UR QL STRIP.AUTO: 0.2 EU/DL (ref 0.2–1)
WBC URNS QL MICRO: >100 /HPF (ref 0–5)

## 2021-04-22 PROCEDURE — 87086 URINE CULTURE/COLONY COUNT: CPT

## 2021-04-22 PROCEDURE — 74011250637 HC RX REV CODE- 250/637: Performed by: EMERGENCY MEDICINE

## 2021-04-22 PROCEDURE — 74011000258 HC RX REV CODE- 258: Performed by: INTERNAL MEDICINE

## 2021-04-22 PROCEDURE — 99218 HC RM OBSERVATION: CPT

## 2021-04-22 PROCEDURE — 65270000029 HC RM PRIVATE

## 2021-04-22 PROCEDURE — 74011250636 HC RX REV CODE- 250/636: Performed by: INTERNAL MEDICINE

## 2021-04-22 PROCEDURE — 81001 URINALYSIS AUTO W/SCOPE: CPT

## 2021-04-22 PROCEDURE — 96374 THER/PROPH/DIAG INJ IV PUSH: CPT

## 2021-04-22 PROCEDURE — 74011000250 HC RX REV CODE- 250: Performed by: INTERNAL MEDICINE

## 2021-04-22 PROCEDURE — 96375 TX/PRO/DX INJ NEW DRUG ADDON: CPT

## 2021-04-22 PROCEDURE — 96376 TX/PRO/DX INJ SAME DRUG ADON: CPT

## 2021-04-22 PROCEDURE — 74011250636 HC RX REV CODE- 250/636: Performed by: EMERGENCY MEDICINE

## 2021-04-22 PROCEDURE — 74011250637 HC RX REV CODE- 250/637: Performed by: INTERNAL MEDICINE

## 2021-04-22 PROCEDURE — 74011250637 HC RX REV CODE- 250/637: Performed by: FAMILY MEDICINE

## 2021-04-22 PROCEDURE — 94762 N-INVAS EAR/PLS OXIMTRY CONT: CPT

## 2021-04-22 RX ORDER — MELATONIN
500 DAILY
Status: DISCONTINUED | OUTPATIENT
Start: 2021-04-22 | End: 2021-04-25 | Stop reason: HOSPADM

## 2021-04-22 RX ORDER — PROMETHAZINE HYDROCHLORIDE 25 MG/1
12.5 TABLET ORAL
Status: DISCONTINUED | OUTPATIENT
Start: 2021-04-22 | End: 2021-04-25 | Stop reason: HOSPADM

## 2021-04-22 RX ORDER — ONDANSETRON 2 MG/ML
4 INJECTION INTRAMUSCULAR; INTRAVENOUS
Status: DISCONTINUED | OUTPATIENT
Start: 2021-04-22 | End: 2021-04-25 | Stop reason: HOSPADM

## 2021-04-22 RX ORDER — MAGNESIUM SULFATE HEPTAHYDRATE 40 MG/ML
2 INJECTION, SOLUTION INTRAVENOUS AS NEEDED
Status: DISCONTINUED | OUTPATIENT
Start: 2021-04-22 | End: 2021-04-25 | Stop reason: HOSPADM

## 2021-04-22 RX ORDER — FAMOTIDINE 20 MG/1
20 TABLET, FILM COATED ORAL 2 TIMES DAILY
Status: DISCONTINUED | OUTPATIENT
Start: 2021-04-22 | End: 2021-04-25 | Stop reason: HOSPADM

## 2021-04-22 RX ORDER — ACETAMINOPHEN 650 MG/1
650 SUPPOSITORY RECTAL
Status: DISCONTINUED | OUTPATIENT
Start: 2021-04-22 | End: 2021-04-25 | Stop reason: HOSPADM

## 2021-04-22 RX ORDER — CYCLOBENZAPRINE HCL 10 MG
5 TABLET ORAL
Status: DISCONTINUED | OUTPATIENT
Start: 2021-04-22 | End: 2021-04-25 | Stop reason: HOSPADM

## 2021-04-22 RX ORDER — SODIUM CHLORIDE 0.9 % (FLUSH) 0.9 %
5-40 SYRINGE (ML) INJECTION AS NEEDED
Status: DISCONTINUED | OUTPATIENT
Start: 2021-04-22 | End: 2021-04-25 | Stop reason: HOSPADM

## 2021-04-22 RX ORDER — SODIUM CHLORIDE 0.9 % (FLUSH) 0.9 %
5-40 SYRINGE (ML) INJECTION EVERY 8 HOURS
Status: DISCONTINUED | OUTPATIENT
Start: 2021-04-22 | End: 2021-04-25 | Stop reason: HOSPADM

## 2021-04-22 RX ORDER — POLYETHYLENE GLYCOL 3350 17 G/17G
17 POWDER, FOR SOLUTION ORAL DAILY PRN
Status: DISCONTINUED | OUTPATIENT
Start: 2021-04-22 | End: 2021-04-25 | Stop reason: HOSPADM

## 2021-04-22 RX ORDER — ACETAMINOPHEN 325 MG/1
650 TABLET ORAL
Status: DISCONTINUED | OUTPATIENT
Start: 2021-04-22 | End: 2021-04-25 | Stop reason: HOSPADM

## 2021-04-22 RX ORDER — GUAIFENESIN 100 MG/5ML
81 LIQUID (ML) ORAL DAILY
Status: DISCONTINUED | OUTPATIENT
Start: 2021-04-22 | End: 2021-04-25 | Stop reason: HOSPADM

## 2021-04-22 RX ORDER — ENOXAPARIN SODIUM 100 MG/ML
40 INJECTION SUBCUTANEOUS DAILY
Status: DISCONTINUED | OUTPATIENT
Start: 2021-04-22 | End: 2021-04-22

## 2021-04-22 RX ORDER — POTASSIUM CHLORIDE 7.45 MG/ML
10 INJECTION INTRAVENOUS AS NEEDED
Status: DISCONTINUED | OUTPATIENT
Start: 2021-04-22 | End: 2021-04-25 | Stop reason: HOSPADM

## 2021-04-22 RX ORDER — LEVOFLOXACIN 5 MG/ML
750 INJECTION, SOLUTION INTRAVENOUS
Status: COMPLETED | OUTPATIENT
Start: 2021-04-22 | End: 2021-04-22

## 2021-04-22 RX ORDER — MIRTAZAPINE 15 MG/1
7.5 TABLET, FILM COATED ORAL
Status: DISCONTINUED | OUTPATIENT
Start: 2021-04-22 | End: 2021-04-25 | Stop reason: HOSPADM

## 2021-04-22 RX ORDER — SILVER SULFADIAZINE 10 G/1000G
CREAM TOPICAL DAILY
Status: DISCONTINUED | OUTPATIENT
Start: 2021-04-22 | End: 2021-04-25 | Stop reason: HOSPADM

## 2021-04-22 RX ORDER — POTASSIUM CHLORIDE 750 MG/1
20 TABLET, FILM COATED, EXTENDED RELEASE ORAL 2 TIMES DAILY
Status: COMPLETED | OUTPATIENT
Start: 2021-04-22 | End: 2021-04-23

## 2021-04-22 RX ORDER — DOCUSATE SODIUM 100 MG/1
100 CAPSULE, LIQUID FILLED ORAL
Status: DISCONTINUED | OUTPATIENT
Start: 2021-04-22 | End: 2021-04-25 | Stop reason: HOSPADM

## 2021-04-22 RX ORDER — IPRATROPIUM BROMIDE AND ALBUTEROL SULFATE 2.5; .5 MG/3ML; MG/3ML
3 SOLUTION RESPIRATORY (INHALATION)
Status: DISCONTINUED | OUTPATIENT
Start: 2021-04-22 | End: 2021-04-25 | Stop reason: HOSPADM

## 2021-04-22 RX ORDER — SODIUM CHLORIDE 9 MG/ML
125 INJECTION, SOLUTION INTRAVENOUS CONTINUOUS
Status: DISCONTINUED | OUTPATIENT
Start: 2021-04-22 | End: 2021-04-25 | Stop reason: HOSPADM

## 2021-04-22 RX ORDER — GABAPENTIN 300 MG/1
300 CAPSULE ORAL 3 TIMES DAILY
Status: DISCONTINUED | OUTPATIENT
Start: 2021-04-22 | End: 2021-04-25 | Stop reason: HOSPADM

## 2021-04-22 RX ORDER — LANOLIN ALCOHOL/MO/W.PET/CERES
200 CREAM (GRAM) TOPICAL DAILY
Status: DISCONTINUED | OUTPATIENT
Start: 2021-04-22 | End: 2021-04-22

## 2021-04-22 RX ORDER — ZINC SULFATE 50(220)MG
220 CAPSULE ORAL DAILY
Status: DISCONTINUED | OUTPATIENT
Start: 2021-04-22 | End: 2021-04-24

## 2021-04-22 RX ORDER — ASPIRIN 325 MG/1
200 TABLET, FILM COATED ORAL DAILY
Status: DISCONTINUED | OUTPATIENT
Start: 2021-04-22 | End: 2021-04-25 | Stop reason: HOSPADM

## 2021-04-22 RX ORDER — TAMSULOSIN HYDROCHLORIDE 0.4 MG/1
0.4 CAPSULE ORAL DAILY
Status: DISCONTINUED | OUTPATIENT
Start: 2021-04-22 | End: 2021-04-25 | Stop reason: HOSPADM

## 2021-04-22 RX ORDER — POTASSIUM CHLORIDE 20 MEQ/1
40 TABLET, EXTENDED RELEASE ORAL
Status: COMPLETED | OUTPATIENT
Start: 2021-04-22 | End: 2021-04-22

## 2021-04-22 RX ORDER — OXYCODONE HYDROCHLORIDE 5 MG/1
5 TABLET ORAL
Status: DISCONTINUED | OUTPATIENT
Start: 2021-04-22 | End: 2021-04-25 | Stop reason: HOSPADM

## 2021-04-22 RX ORDER — CLONAZEPAM 0.5 MG/1
0.25 TABLET ORAL 2 TIMES DAILY
Status: DISCONTINUED | OUTPATIENT
Start: 2021-04-22 | End: 2021-04-25 | Stop reason: HOSPADM

## 2021-04-22 RX ADMIN — Medication 500 UNITS: at 08:26

## 2021-04-22 RX ADMIN — Medication 10 ML: at 23:15

## 2021-04-22 RX ADMIN — SODIUM CHLORIDE 1000 ML: 9 INJECTION, SOLUTION INTRAVENOUS at 05:29

## 2021-04-22 RX ADMIN — POTASSIUM CHLORIDE 20 MEQ: 750 TABLET, EXTENDED RELEASE ORAL at 21:57

## 2021-04-22 RX ADMIN — OXYCODONE 5 MG: 5 TABLET ORAL at 15:28

## 2021-04-22 RX ADMIN — CEFEPIME 2 G: 2 INJECTION, POWDER, FOR SOLUTION INTRAVENOUS at 10:58

## 2021-04-22 RX ADMIN — CYCLOBENZAPRINE 5 MG: 10 TABLET, FILM COATED ORAL at 15:28

## 2021-04-22 RX ADMIN — ACETAMINOPHEN 650 MG: 325 TABLET ORAL at 18:34

## 2021-04-22 RX ADMIN — ZINC SULFATE 220 MG (50 MG) CAPSULE 200 MG: CAPSULE at 08:26

## 2021-04-22 RX ADMIN — CLONAZEPAM 0.25 MG: 0.5 TABLET ORAL at 08:25

## 2021-04-22 RX ADMIN — CLONAZEPAM 0.25 MG: 0.5 TABLET ORAL at 21:57

## 2021-04-22 RX ADMIN — FAMOTIDINE 20 MG: 20 TABLET, FILM COATED ORAL at 08:26

## 2021-04-22 RX ADMIN — OXYCODONE 5 MG: 5 TABLET ORAL at 22:14

## 2021-04-22 RX ADMIN — SODIUM CHLORIDE 150 ML/HR: 9 INJECTION, SOLUTION INTRAVENOUS at 00:12

## 2021-04-22 RX ADMIN — POTASSIUM CHLORIDE 40 MEQ: 1500 TABLET, EXTENDED RELEASE ORAL at 02:05

## 2021-04-22 RX ADMIN — Medication 10 ML: at 13:59

## 2021-04-22 RX ADMIN — ASPIRIN 81 MG: 81 TABLET, CHEWABLE ORAL at 08:26

## 2021-04-22 RX ADMIN — GABAPENTIN 300 MG: 300 CAPSULE ORAL at 15:28

## 2021-04-22 RX ADMIN — SODIUM CHLORIDE 125 ML/HR: 9 INJECTION, SOLUTION INTRAVENOUS at 15:28

## 2021-04-22 RX ADMIN — MIRTAZAPINE 7.5 MG: 15 TABLET, FILM COATED ORAL at 04:21

## 2021-04-22 RX ADMIN — GABAPENTIN 300 MG: 300 CAPSULE ORAL at 23:35

## 2021-04-22 RX ADMIN — FAMOTIDINE 20 MG: 20 TABLET, FILM COATED ORAL at 21:57

## 2021-04-22 RX ADMIN — CEFEPIME 2 G: 2 INJECTION, POWDER, FOR SOLUTION INTRAVENOUS at 21:57

## 2021-04-22 RX ADMIN — LEVOFLOXACIN 750 MG: 5 INJECTION, SOLUTION INTRAVENOUS at 00:12

## 2021-04-22 RX ADMIN — THIAMINE HCL TAB 100 MG 200 MG: 100 TAB at 08:26

## 2021-04-22 RX ADMIN — CYCLOBENZAPRINE 5 MG: 10 TABLET, FILM COATED ORAL at 22:13

## 2021-04-22 RX ADMIN — SILVER SULFADIAZINE: 10 CREAM TOPICAL at 08:24

## 2021-04-22 RX ADMIN — SODIUM CHLORIDE 1000 ML: 9 INJECTION, SOLUTION INTRAVENOUS at 00:12

## 2021-04-22 RX ADMIN — CEFEPIME 2 G: 2 INJECTION, POWDER, FOR SOLUTION INTRAVENOUS at 04:20

## 2021-04-22 RX ADMIN — GABAPENTIN 300 MG: 300 CAPSULE ORAL at 08:26

## 2021-04-22 RX ADMIN — TAMSULOSIN HYDROCHLORIDE 0.4 MG: 0.4 CAPSULE ORAL at 08:26

## 2021-04-22 RX ADMIN — SODIUM CHLORIDE 125 ML/HR: 9 INJECTION, SOLUTION INTRAVENOUS at 06:32

## 2021-04-22 NOTE — PROGRESS NOTES
Patient seen and examined. H&P reviewed.     Sepsis  -Patient presents with fever, tachycardia and leukocytosis meeting sepsis criteria  -Sepsis due to UTI  -Follow cultures and monitor hemodynamics    Urinary tract infection  -Urinary tract infection associated with indwelling Ferrell catheter  -Continue cefepime and follow urine culture    Hematuria  -Microscopic hematuria due to UTI    Hypokalemia  -Continue replace and monitor electrolytes    Paraplegia  -Patient is a resident of a long-term care facility  -Patient states that he has been able to ambulate  -Patient with neurogenic bladder requiring chronic indwelling Ferrell catheter    Sacral decubitus ulcer  -Wound care

## 2021-04-22 NOTE — PROGRESS NOTES
Problem: Falls - Risk of  Goal: *Absence of Falls  Description: Document Rogelio Willson Fall Risk and appropriate interventions in the flowsheet.   Note: Fall Risk Interventions:  Mobility Interventions: Patient to call before getting OOB         Medication Interventions: Patient to call before getting OOB    Elimination Interventions: Call light in reach    History of Falls Interventions: Door open when patient unattended

## 2021-04-22 NOTE — H&P
Hospitalist Admission Note    NAME: Kianna Murry   :  1965   MRN:  [id number]     Date/Time:  2021 3:36 AM    Patient PCP: Leonel Edwards MD  _____________________________________________________________________  Given the patient's current clinical presentation, I have a high level of concern for decompensation if discharged from the emergency department. Complex decision making was performed, which includes reviewing the patient's available past medical records, laboratory results, and x-ray films. My assessment of this patient's clinical condition and my plan of care is as follows. Assessment / Plan:  1. Sepsis: Secondary to acute UTI. Patient has had IV levaquin in ER, will follow urine and bld cx, cefepime given indwelling bhardwaj. IVF, supportive care. Lactate and bp normal    2. Acute UTI: Complicated in patient with chronic indwelling bhardwaj. Management as above, bhardwaj was changed in ER. Renal US    3. Hematuria: Microscopic. Follow H and H, most likely from acute cystitis    4. Hypokalemia: Repleted in ER and will repeat    5. Paraplegia: Has had spinal surgery, urinary retention chronically. Continue supportive nursing care    6. Sacral decubitus wound: unstagable and present on admission. Wound care consult if available    DVT prophylaxis: SCDs, hold chemical prophylaxis given hematuria      Code Status: full        Subjective:   CHIEF COMPLAINT:  Sent from facility for fever and chills, cloudy urine    HISTORY OF PRESENT ILLNESS:     Kianna Murry is a 48 y.o.  male with past medical and surgical hx outlined below who presents with fever and chills from nursing home and clincial findings of sepsis secondary to complicated UTI. He has chronic indwelling bhardwaj catheter for urinary retention and urine is cloudy and most likely source. He is noted to have an unstagable sacral wound. Tmax 103.3 with  fluid responsive.  He is AAOx3 in my face to face introductory visit in the ER. We were asked to admit for work up and evaluation of the above problems. Past Medical History:   Diagnosis Date    History of spinal surgery     Urinary tract infection     frequent uti        Past Surgical History:   Procedure Laterality Date    HX ORTHOPAEDIC         Social History     Tobacco Use    Smoking status: Former Smoker     Types: Cigarettes    Smokeless tobacco: Never Used   Substance Use Topics    Alcohol use: Not Currently        History reviewed. No pertinent family history. No Known Allergies     Prior to Admission medications    Medication Sig Start Date End Date Taking? Authorizing Provider   silver sulfADIAZINE (SILVADENE) 1 % topical cream Apply  to affected area daily. 1/19/21   Nestor Fernandez MD   cefdinir (OMNICEF) 300 mg capsule Take 1 Cap by mouth two (2) times a day. 1/18/21   Nestor Fernandez MD   docusate sodium (COLACE) 100 mg capsule Take 100 mg by mouth two (2) times daily as needed for Constipation. Provider, Historical   mirtazapine (REMERON) 7.5 mg tablet Take 7.5 mg by mouth nightly. Provider, Historical   oxyCODONE IR (ROXICODONE) 5 mg immediate release tablet Take 5 mg by mouth every four (4) hours as needed for Pain. Provider, Historical   zinc sulfate (ZINCATE) 220 (50) mg capsule Take 220 mg by mouth daily. Provider, Historical   acetaminophen (TYLENOL) 325 mg tablet Take 650 mg by mouth every four (4) hours as needed. 7/23/20   Provider, Historical   aspirin 81 mg chewable tablet Take 81 mg by mouth daily. 7/28/20   Provider, Historical   cholecalciferol (VITAMIN D3) (1000 Units /25 mcg) tablet Take 500 Units by mouth daily. 7/24/20   Provider, Historical   clonazePAM (KlonoPIN) 0.5 mg tablet Take 0.25 mg by mouth two (2) times a day. 7/23/20   Provider, Historical   cyclobenzaprine (FLEXERIL) 5 mg tablet Take 5 mg by mouth every eight (8) hours as needed.  7/23/20   Provider, Historical   enoxaparin (LOVENOX) 40 mg/0.4 mL 40 mg by SubCUTAneous route. 7/23/20   Provider, Historical   famotidine (PEPCID) 20 mg tablet Take 20 mg by mouth two (2) times a day. 7/28/20   Provider, Historical   gabapentin (NEURONTIN) 300 mg capsule Take 300 mg by mouth three (3) times daily. 7/23/20   Provider, Historical   albuterol-ipratropium (DUO-NEB) 2.5 mg-0.5 mg/3 ml nebu Take 3 mL by inhalation every six (6) hours as needed. 7/23/20   Provider, Historical   thiamine HCL (B-1) 100 mg tablet Take 200 mg by mouth daily. 7/28/20   Provider, Historical   tamsulosin (FLOMAX) 0.4 mg capsule Take 0.4 mg by mouth. 7/23/20   Provider, Historical   multivit-min-iron-folic-vit K1 8 RD-517 mcg- 10 mcg chew Take 1 Tab by mouth daily. 7/28/20   Provider, Historical       REVIEW OF SYSTEMS:     I am not able to complete the review of systems because:    The patient is intubated and sedated    The patient has altered mental status due to his acute medical problems    The patient has baseline aphasia from prior stroke(s)    The patient has baseline dementia and is not reliable historian    The patient is in acute medical distress and unable to provide information           Total of 12 systems reviewed as follows:       POSITIVE= underlined text  Negative = text not underlined  General:  fever, chills, sweats, generalized weakness, weight loss/gain,      loss of appetite   Eyes:    blurred vision, eye pain, loss of vision, double vision  ENT:    rhinorrhea, pharyngitis   Respiratory:   cough, sputum production, SOB, TRIPP, wheezing, pleuritic pain   Cardiology:   chest pain, palpitations, orthopnea, PND, edema, syncope   Gastrointestinal:  abdominal pain , N/V, diarrhea, dysphagia, constipation, bleeding   Genitourinary:  frequency, urgency, dysuria, hematuria, incontinence   Muskuloskeletal :  arthralgia, myalgia, back pain  Hematology:  easy bruising, nose or gum bleeding, lymphadenopathy   Dermatological: rash, ulceration, pruritis, color change / jaundice  Endocrine:   hot flashes or polydipsia   Neurological:  headache, dizziness, confusion, focal weakness, paresthesia,     Speech difficulties, memory loss, gait difficulty  Psychological: Feelings of anxiety, depression, agitation    Objective:   VITALS:    Visit Vitals  /84 (BP 1 Location: Right upper arm, BP Patient Position: At rest)   Pulse 83   Temp 97.9 °F (36.6 °C)   Resp 21   Ht 6' (1.829 m)   Wt 60 kg (132 lb 4.4 oz)   SpO2 97%   BMI 17.94 kg/m²       PHYSICAL EXAM: Performed with nurse assistance and in collaboration with ER provider exam. Telestethoscope not working and I relied on both nurse and Dr. Zenon Winter exam for this portion of admission    General:    Alert, cooperative, no distress, appears stated age. HEENT: Atraumatic, anicteric sclerae, pink conjunctivae     No oral ulcers, mucosa moist, throat clear, dentition fair  Neck:  Supple, symmetrical,  thyroid: non tender  Lungs:   Clear to auscultation bilaterally. No Wheezing or Rhonchi. No rales. Chest wall:  No tenderness  No Accessory muscle use. Heart:   Regular  rhythm,  No  murmur   No edema  Abdomen:   Soft, non-tender. Not distended. Bowel sounds normal  Extremities: No cyanosis. No clubbing,      Skin turgor normal, Capillary refill normal, Radial dial pulse 2+  Skin:     Not pale. Not Jaundiced  No rashes   Psych:  Good insight. Not depressed. Not anxious or agitated. Neurologic: EOMs intact. No facial asymmetry. No aphasia or slurred speech. Symmetrical strength, Sensation grossly intact.  Alert and oriented X 4.     _______________________________________________________________________  Care Plan discussed with:    Comments   Patient x    Family      RN x    Care Manager                    Consultant:      _______________________________________________________________________  Expected  Disposition:   Home with Family    HH/PT/OT/RN    SNF/LTC x   [facility]    ________________________________________________________________________  TOTAL TIME:  39  Minutes    Critical Care Provided     Minutes non procedure based      Comments     Reviewed previous records   >50% of visit spent in counseling and coordination of care  Discussion with patient and/or family and questions answered       Given the patient's current clinical presentation, I have a high level of concern for decompensation if discharged from the ED. Complex decision making was performed which includes reviewing the patient's available past medical records, laboratory results, and Xray films. I have also directly communicated my plan and discussed this case with the involved ED physician.     ____________________________________________________________________  Attila Schaeffer MD  Telehospitalist     I performed this consultation using real-tara telehealth tools including a live video connection between my location and the patient's location. As the provider for this telehealth service, I attest that I introduced myself to the patient, provided my credentials, disclosed my location and determined that based on my review of patient's chart and/or discussion with members of the patient's treatment team, telemedicine via real time, 2 way, interactive audio and video platform is an appropriate and effective means of providing service. The patient and I mutually agree that this visit is appropriate for telemedicine as well. Procedures: see electronic medical records for all procedures/Xrays and details which were not copied into this note but were reviewed prior to creation of Plan.     LAB DATA REVIEWED:    Recent Results (from the past 24 hour(s))   CBC WITH AUTOMATED DIFF    Collection Time: 04/21/21 11:28 PM   Result Value Ref Range    WBC 13.6 (H) 4.4 - 11.3 K/uL    RBC 4.32 (L) 4.50 - 5.90 M/uL    HGB 12.7 (L) 13.5 - 17.5 g/dL    HCT 37.0 (L) 41 - 53 %    MCV 85.6 80 - 100 FL    MCH 29.4 (L) 31 - 34 PG    MCHC 34.3 31.0 - 36.0 g/dL    RDW 13.9 11.5 - 14.5 %    PLATELET 285 890 - 400 K/uL    MPV 7.8 6.5 - 11.5 FL    NRBC 0.0  WBC    ABSOLUTE NRBC 0.00 K/uL    NEUTROPHILS 71 42 - 75 %    LYMPHOCYTES 14 (L) 20.5 - 51.1 %    MONOCYTES 15 (H) 1.7 - 9.3 %    EOSINOPHILS 0 (L) 0.9 - 2.9 %    BASOPHILS 0 0.0 - 2.5 %    ABS. NEUTROPHILS 9.6 (H) 1.8 - 7.7 K/UL    ABS. LYMPHOCYTES 1.9 1.0 - 4.8 K/UL    ABS. MONOCYTES 2.0 0.2 - 2.4 K/UL    ABS. EOSINOPHILS 0.0 0.0 - 0.7 K/UL    ABS. BASOPHILS 0.0 0.0 - 0.2 K/UL   METABOLIC PANEL, COMPREHENSIVE    Collection Time: 04/21/21 11:28 PM   Result Value Ref Range    Sodium 134 (L) 136 - 145 mmol/L    Potassium 3.2 (L) 3.5 - 5.1 mmol/L    Chloride 96 (L) 97 - 108 mmol/L    CO2 27 21 - 32 mmol/L    Anion gap 11 5 - 15 mmol/L    Glucose 118 (H) 65 - 100 mg/dL    BUN 19 6 - 20 mg/dL    Creatinine 0.91 0.70 - 1.30 mg/dL    BUN/Creatinine ratio 21 (H) 12 - 20      GFR est AA >60 >60 ml/min/1.73m2    GFR est non-AA >60 >60 ml/min/1.73m2    Calcium 8.3 (L) 8.5 - 10.1 mg/dL    Bilirubin, total 0.5 0.2 - 1.0 mg/dL    AST (SGOT) 17 15 - 37 U/L    ALT (SGPT) 25 12 - 78 U/L    Alk.  phosphatase 72 45 - 117 U/L    Protein, total 8.0 6.4 - 8.2 g/dL    Albumin 3.0 (L) 3.5 - 5.0 g/dL    Globulin 5.0 (H) 2.0 - 4.0 g/dL    A-G Ratio 0.6 (L) 1.1 - 2.2     LACTIC ACID    Collection Time: 04/21/21 11:28 PM   Result Value Ref Range    Lactic acid 0.6 0.4 - 2.0 mmol/L   URINALYSIS W/ RFLX MICROSCOPIC    Collection Time: 04/22/21  1:05 AM   Result Value Ref Range    Color Yellow/Straw      Appearance Clear Clear      Specific gravity 1.015 1.003 - 1.030      pH (UA) 8.0 5.0 - 8.0      Protein 100 (A) Negative mg/dL    Glucose Negative Negative mg/dL    Ketone Negative Negative mg/dL    Bilirubin Negative Negative      Blood Large (A) Negative      Urobilinogen 0.2 0.2 - 1.0 EU/dL    Nitrites Negative Negative      Leukocyte Esterase Large (A) Negative     URINE MICROSCOPIC    Collection Time: 04/22/21  1:05 AM   Result Value Ref Range    WBC >100 (H) 0 - 5 /hpf    RBC >100 (H) 0 - 3 /hpf    Bacteria 4+ (A) Negative /hpf    Amorphous Crystals 3+ (A) Negative

## 2021-04-22 NOTE — ROUTINE PROCESS
TRANSFER - IN REPORT:    Verbal report received from [employer], RN on Aleda Prader  being received from ED for routine progression of care      Report consisted of patients Situation, Background, Assessment and   Recommendations(SBAR). Information from the following report(s) SBAR was reviewed with the receiving nurse. Opportunity for questions and clarification was provided. Assessment completed upon patients arrival to unit and care assumed.

## 2021-04-22 NOTE — PROGRESS NOTES
Reason for Admission:                       RUR Score: 15               Plan for utilizing home health: No        PCP: First and Last name:  Merline Mccormick MD     Name of Practice: [facility]   Are you a current patient: Yes/No:  Yes   Approximate date of last visit:  unknown   Can you participate in a virtual visit with your PCP: No                    Current Advanced Directive/Advance Care Plan: Full Code      Healthcare Decision Maker:   Click here to complete [employer] including selection of the Healthcare Decision Maker Relationship (ie \"Primary\")             Primary Decision Maker: [facility] - [phone]                  Transition of Care Plan: Discharge back to long term [facility]- [address] [place] [address].

## 2021-04-22 NOTE — ED PROVIDER NOTES
Patient with paraplegia and indwelling Ferrell catheter , presents with complaint of fever and chills, with cloudy urine. He has a history of recurrent urinary tract infections. He denies cough or shortness of breath. No other acute complaints. He has a sacral ulcer that is being managed by wound care. Duration:  1 day    Intensity: moderate    Modified by: nothing. Past Medical History:   Diagnosis Date    History of spinal surgery     Urinary tract infection     frequent uti       Past Surgical History:   Procedure Laterality Date    HX ORTHOPAEDIC           History reviewed. No pertinent family history.     Social History     Socioeconomic History    Marital status: SINGLE     Spouse name: Not on file    Number of children: Not on file    Years of education: Not on file    Highest education level: Not on file   Occupational History    Not on file   Social Needs    Financial resource strain: Not on file    Food insecurity     Worry: Not on file     Inability: Not on file    Transportation needs     Medical: Not on file     Non-medical: Not on file   Tobacco Use    Smoking status: Former Smoker     Types: Cigarettes    Smokeless tobacco: Never Used   Substance and Sexual Activity    Alcohol use: Not Currently    Drug use: Never    Sexual activity: Not Currently   Lifestyle    Physical activity     Days per week: 0 days     Minutes per session: 0 min    Stress: Very much   Relationships    Social connections     Talks on phone: More than three times a week     Gets together: Never     Attends Jain service: Never     Active member of club or organization: No     Attends meetings of clubs or organizations: Never     Relationship status: Not on file    Intimate partner violence     Fear of current or ex partner: No     Emotionally abused: No     Physically abused: No     Forced sexual activity: No   Other Topics Concern   [address] Service Not Asked    Blood Transfusions Not Asked    Caffeine Concern Not Asked    Occupational Exposure Not Asked    Hobby Hazards Not Asked    Sleep Concern Not Asked    Stress Concern Not Asked    Weight Concern Not Asked    Special Diet Not Asked    Back Care Not Asked    Exercise Not Asked    Bike Helmet Not Asked   [address] Not Asked    Self-Exams Not Asked   Social History Narrative    Not on file         ALLERGIES: Patient has no known allergies. Review of Systems   Constitutional: Positive for chills, fatigue and fever. HENT: Negative. Eyes: Negative. Respiratory: Negative. Cardiovascular: Negative. Endocrine: Negative. Genitourinary: Positive for dysuria. Musculoskeletal: Negative. Skin: Negative. Allergic/Immunologic: Negative. Neurological: Negative. Hematological: Negative. Psychiatric/Behavioral: Negative. Vitals:    04/21/21 2309   BP: 116/82   Pulse: (!) 120   Resp: 20   Temp: (!) 103.3 °F (39.6 °C)   SpO2: 95%   Weight: 60 kg (132 lb 4.4 oz)   Height: 6' (1.829 m)            Physical Exam  Vitals signs and nursing note reviewed. Constitutional:       General: He is not in acute distress. HENT:      Head: Normocephalic and atraumatic. Eyes:      Extraocular Movements: Extraocular movements intact. Pupils: Pupils are equal, round, and reactive to light. Neck:      Musculoskeletal: Normal range of motion and neck supple. Cardiovascular:      Rate and Rhythm: Normal rate and regular rhythm. Pulses: Normal pulses. Heart sounds: Normal heart sounds. Pulmonary:      Effort: Pulmonary effort is normal.      Breath sounds: Normal breath sounds. Abdominal:      General: Abdomen is flat. Bowel sounds are normal. There is distension. Palpations: Abdomen is soft. Tenderness: There is no abdominal tenderness. There is no rebound. Musculoskeletal: Normal range of motion. Skin:     General: Skin is warm and dry.       Comments: + sacral pressure ulcer Neurological:      General: No focal deficit present. Mental Status: He is alert and oriented to person, place, and time. Mental status is at baseline.       Comments: Paraplegia   Psychiatric:         Mood and Affect: Mood normal.         Behavior: Behavior normal.          MDM  Number of Diagnoses or Management Options  Risk of Complications, Morbidity, and/or Mortality  Presenting problems: moderate  Diagnostic procedures: moderate  Management options: moderate  General comments: Discussed with Dr Dania Tracy at 1:58 and she will admit     Patient Progress  Patient progress: improved         Procedures

## 2021-04-22 NOTE — PROGRESS NOTES
Care Management Interventions  PCP Verified by CM: Yes  Mode of Transport at Discharge: [address] (CM Consult): Hamelissaplatelizabeth 61  Confirm Follow Up Transport: [facility] for Transition of Care is Related to the Following Treatment Goals : Pt is a long term care resident at [address] [place] [address]. Pt to d/c back to that facility.    Discharge Location  Discharge Placement: [address] [place] [address]

## 2021-04-23 LAB
ANION GAP SERPL CALC-SCNC: 12 MMOL/L (ref 5–15)
BASOPHILS # BLD: 0.1 K/UL (ref 0–0.2)
BASOPHILS NFR BLD: 1 % (ref 0–2.5)
BUN SERPL-MCNC: 13 MG/DL (ref 6–20)
BUN/CREAT SERPL: 17 (ref 12–20)
CA-I BLD-MCNC: 8 MG/DL (ref 8.5–10.1)
CHLORIDE SERPL-SCNC: 102 MMOL/L (ref 97–108)
CO2 SERPL-SCNC: 23 MMOL/L (ref 21–32)
CREAT SERPL-MCNC: 0.78 MG/DL (ref 0.7–1.3)
EOSINOPHIL # BLD: 0 K/UL (ref 0–0.7)
EOSINOPHIL NFR BLD: 0 % (ref 0.9–2.9)
ERYTHROCYTE [DISTWIDTH] IN BLOOD BY AUTOMATED COUNT: 13.9 % (ref 11.5–14.5)
GLUCOSE SERPL-MCNC: 92 MG/DL (ref 65–100)
HCT VFR BLD AUTO: 34 % (ref 41–53)
HGB BLD-MCNC: 11.5 G/DL (ref 13.5–17.5)
LYMPHOCYTES # BLD: 1.2 K/UL (ref 1–4.8)
LYMPHOCYTES NFR BLD: 12 % (ref 20.5–51.1)
MAGNESIUM SERPL-MCNC: 1.5 MG/DL (ref 1.6–2.4)
MCH RBC QN AUTO: 29.3 PG (ref 31–34)
MCHC RBC AUTO-ENTMCNC: 33.8 G/DL (ref 31–36)
MCV RBC AUTO: 86.7 FL (ref 80–100)
MONOCYTES # BLD: 1.3 K/UL (ref 0.2–2.4)
MONOCYTES NFR BLD: 13 % (ref 1.7–9.3)
NEUTS SEG # BLD: 7.1 K/UL (ref 1.8–7.7)
NEUTS SEG NFR BLD: 74 % (ref 42–75)
NRBC # BLD: 0.02 K/UL
NRBC BLD-RTO: 0.2 PER 100 WBC
PLATELET # BLD AUTO: 242 K/UL (ref 150–400)
PMV BLD AUTO: 8.4 FL (ref 6.5–11.5)
POTASSIUM SERPL-SCNC: 3.7 MMOL/L (ref 3.5–5.1)
RBC # BLD AUTO: 3.93 M/UL (ref 4.5–5.9)
SODIUM SERPL-SCNC: 137 MMOL/L (ref 136–145)
WBC # BLD AUTO: 9.7 K/UL (ref 4.4–11.3)

## 2021-04-23 PROCEDURE — 85025 COMPLETE CBC W/AUTO DIFF WBC: CPT

## 2021-04-23 PROCEDURE — 74011250637 HC RX REV CODE- 250/637: Performed by: INTERNAL MEDICINE

## 2021-04-23 PROCEDURE — 99218 HC RM OBSERVATION: CPT

## 2021-04-23 PROCEDURE — 65270000029 HC RM PRIVATE

## 2021-04-23 PROCEDURE — 80048 BASIC METABOLIC PNL TOTAL CA: CPT

## 2021-04-23 PROCEDURE — 74011000258 HC RX REV CODE- 258: Performed by: INTERNAL MEDICINE

## 2021-04-23 PROCEDURE — 96376 TX/PRO/DX INJ SAME DRUG ADON: CPT

## 2021-04-23 PROCEDURE — 74011250637 HC RX REV CODE- 250/637: Performed by: FAMILY MEDICINE

## 2021-04-23 PROCEDURE — 83735 ASSAY OF MAGNESIUM: CPT

## 2021-04-23 PROCEDURE — 94762 N-INVAS EAR/PLS OXIMTRY CONT: CPT

## 2021-04-23 PROCEDURE — 74011250636 HC RX REV CODE- 250/636: Performed by: INTERNAL MEDICINE

## 2021-04-23 RX ADMIN — CYCLOBENZAPRINE 5 MG: 10 TABLET, FILM COATED ORAL at 05:51

## 2021-04-23 RX ADMIN — Medication 500 UNITS: at 08:58

## 2021-04-23 RX ADMIN — ASPIRIN 81 MG: 81 TABLET, CHEWABLE ORAL at 08:58

## 2021-04-23 RX ADMIN — CEFEPIME 2 G: 2 INJECTION, POWDER, FOR SOLUTION INTRAVENOUS at 11:55

## 2021-04-23 RX ADMIN — SILVER SULFADIAZINE: 10 CREAM TOPICAL at 08:59

## 2021-04-23 RX ADMIN — SODIUM CHLORIDE 125 ML/HR: 9 INJECTION, SOLUTION INTRAVENOUS at 00:07

## 2021-04-23 RX ADMIN — Medication 10 ML: at 22:00

## 2021-04-23 RX ADMIN — THIAMINE HCL TAB 100 MG 200 MG: 100 TAB at 11:55

## 2021-04-23 RX ADMIN — Medication 10 ML: at 05:57

## 2021-04-23 RX ADMIN — MIRTAZAPINE 7.5 MG: 15 TABLET, FILM COATED ORAL at 21:29

## 2021-04-23 RX ADMIN — ZINC SULFATE 220 MG (50 MG) CAPSULE 200 MG: CAPSULE at 08:58

## 2021-04-23 RX ADMIN — FAMOTIDINE 20 MG: 20 TABLET, FILM COATED ORAL at 08:58

## 2021-04-23 RX ADMIN — CEFEPIME 2 G: 2 INJECTION, POWDER, FOR SOLUTION INTRAVENOUS at 19:35

## 2021-04-23 RX ADMIN — SODIUM CHLORIDE 125 ML/HR: 9 INJECTION, SOLUTION INTRAVENOUS at 08:53

## 2021-04-23 RX ADMIN — GABAPENTIN 300 MG: 300 CAPSULE ORAL at 21:28

## 2021-04-23 RX ADMIN — TAMSULOSIN HYDROCHLORIDE 0.4 MG: 0.4 CAPSULE ORAL at 08:58

## 2021-04-23 RX ADMIN — Medication 10 ML: at 14:46

## 2021-04-23 RX ADMIN — CEFEPIME 2 G: 2 INJECTION, POWDER, FOR SOLUTION INTRAVENOUS at 04:06

## 2021-04-23 RX ADMIN — SODIUM CHLORIDE 125 ML/HR: 9 INJECTION, SOLUTION INTRAVENOUS at 21:33

## 2021-04-23 RX ADMIN — POTASSIUM CHLORIDE 20 MEQ: 750 TABLET, EXTENDED RELEASE ORAL at 08:58

## 2021-04-23 RX ADMIN — GABAPENTIN 300 MG: 300 CAPSULE ORAL at 17:03

## 2021-04-23 RX ADMIN — GABAPENTIN 300 MG: 300 CAPSULE ORAL at 08:58

## 2021-04-23 RX ADMIN — CLONAZEPAM 0.25 MG: 0.5 TABLET ORAL at 08:58

## 2021-04-23 RX ADMIN — CLONAZEPAM 0.25 MG: 0.5 TABLET ORAL at 21:28

## 2021-04-23 RX ADMIN — POTASSIUM CHLORIDE 20 MEQ: 750 TABLET, EXTENDED RELEASE ORAL at 21:27

## 2021-04-23 RX ADMIN — OXYCODONE 5 MG: 5 TABLET ORAL at 05:51

## 2021-04-23 RX ADMIN — FAMOTIDINE 20 MG: 20 TABLET, FILM COATED ORAL at 21:28

## 2021-04-23 NOTE — PROGRESS NOTES
Problem: Falls - Risk of  Goal: *Absence of Falls  Description: Document [place] Fall Risk and appropriate interventions in the flowsheet.   Outcome: Progressing Towards Goal  Note: Fall Risk Interventions:  Mobility Interventions: Bed/chair exit alarm, Patient to call before getting OOB         Medication Interventions: Bed/chair exit alarm, Patient to call before getting OOB    Elimination Interventions: Call light in reach    History of Falls Interventions: Bed/chair exit alarm, Evaluate medications/consider consulting pharmacy

## 2021-04-23 NOTE — PROGRESS NOTES
Problem: Falls - Risk of  Goal: *Absence of Falls  Description: Document Tamica Ruts Fall Risk and appropriate interventions in the flowsheet. Outcome: Progressing Towards Goal  Note: Fall Risk Interventions:  Mobility Interventions: Bed/chair exit alarm, Patient to call before getting OOB         Medication Interventions: Bed/chair exit alarm, Patient to call before getting OOB    Elimination Interventions: Call light in reach    History of Falls Interventions: Bed/chair exit alarm, Evaluate medications/consider consulting pharmacy         Problem: Patient Education: Go to Patient Education Activity  Goal: Patient/Family Education  Outcome: Progressing Towards Goal     Problem: Pressure Injury - Risk of  Goal: *Prevention of pressure injury  Description: Document Brayden Scale and appropriate interventions in the flowsheet. Outcome: Progressing Towards Goal  Note: Pressure Injury Interventions:  Sensory Interventions: Assess changes in LOC         Activity Interventions: PT/OT evaluation    Mobility Interventions: Turn and reposition approx. every two hours(pillow and wedges)    Nutrition Interventions: Document food/fluid/supplement intake    Friction and Shear Interventions: Lift team/patient mobility team                Problem: Patient Education: Go to Patient Education Activity  Goal: Patient/Family Education  Outcome: Progressing Towards Goal     Problem: Falls - Risk of  Goal: *Absence of Falls  Description: Document Tamica Ruts Fall Risk and appropriate interventions in the flowsheet.   Outcome: Progressing Towards Goal  Note: Fall Risk Interventions:  Mobility Interventions: Bed/chair exit alarm, Patient to call before getting OOB         Medication Interventions: Bed/chair exit alarm, Patient to call before getting OOB    Elimination Interventions: Call light in reach    History of Falls Interventions: Bed/chair exit alarm, Evaluate medications/consider consulting pharmacy         Problem: Patient Education: Go to Patient Education Activity  Goal: Patient/Family Education  Outcome: Progressing Towards Goal     Problem: Pressure Injury - Risk of  Goal: *Prevention of pressure injury  Description: Document Brayden Scale and appropriate interventions in the flowsheet. Outcome: Progressing Towards Goal  Note: Pressure Injury Interventions:  Sensory Interventions: Assess changes in LOC         Activity Interventions: PT/OT evaluation    Mobility Interventions: Turn and reposition approx.  every two hours(pillow and wedges)    Nutrition Interventions: Document food/fluid/supplement intake    Friction and Shear Interventions: Lift team/patient mobility team                Problem: Patient Education: Go to Patient Education Activity  Goal: Patient/Family Education  Outcome: Progressing Towards Goal

## 2021-04-23 NOTE — PROGRESS NOTES
Progress Note    Patient: Hayde Doshi MRN: [id number]  SSN: [id number]    YOB: 1965  Age: 48 y.o. Sex: male      Admit Date: 4/21/2021    LOS: 1 day     Subjective:     Patient presents with sepsis due to UTI. Patient is still febrile. Denies any abdominal pain, nausea vomiting. Objective:     Vitals:    04/22/21 2339 04/23/21 0000 04/23/21 0617 04/23/21 0755   BP: (!) 132/94      Pulse: (!) 105 97     Resp: 20      Temp: 99 °F (37.2 °C)      SpO2:    94%   Weight:   62.6 kg (138 lb)    Height:            Intake and Output:  Current Shift: No intake/output data recorded. Last three shifts: 04/21 1901 - 04/23 0700  In: 1215 [I.V.:1215]  Out: 2800 [Urine:2800]    Physical Exam:   GENERAL: alert, cooperative, no distress, appears stated age  THROAT & NECK: normal and no erythema or exudates noted. LUNG: clear to auscultation bilaterally  HEART: regular rate and rhythm, S1, S2 normal, no murmur, click, rub or gallop  ABDOMEN: soft, non-tender. Bowel sounds normal. No masses,  no organomegaly  EXTREMITIES:  extremities normal, atraumatic, no cyanosis or edema  SKIN: no rash or abnormalities  NEUROLOGIC: AOx3. PSYCHIATRIC: non focal    Lab/Data Review: All lab results for the last 24 hours reviewed.      Recent Results (from the past 24 hour(s))   METABOLIC PANEL, BASIC    Collection Time: 04/23/21  5:41 AM   Result Value Ref Range    Sodium 137 136 - 145 mmol/L    Potassium 3.7 3.5 - 5.1 mmol/L    Chloride 102 97 - 108 mmol/L    CO2 23 21 - 32 mmol/L    Anion gap 12 5 - 15 mmol/L    Glucose 92 65 - 100 mg/dL    BUN 13 6 - 20 mg/dL    Creatinine 0.78 0.70 - 1.30 mg/dL    BUN/Creatinine ratio 17 12 - 20      GFR est AA >60 >60 ml/min/1.73m2    GFR est non-AA >60 >60 ml/min/1.73m2    Calcium 8.0 (L) 8.5 - 10.1 mg/dL   MAGNESIUM    Collection Time: 04/23/21  5:41 AM   Result Value Ref Range    Magnesium 1.5 (L) 1.6 - 2.4 mg/dL   CBC WITH AUTOMATED DIFF    Collection Time: 04/23/21  5:41 AM   Result Value Ref Range    WBC 9.7 4.4 - 11.3 K/uL    RBC 3.93 (L) 4.50 - 5.90 M/uL    HGB 11.5 (L) 13.5 - 17.5 g/dL    HCT 34.0 (L) 41 - 53 %    MCV 86.7 80 - 100 FL    MCH 29.3 (L) 31 - 34 PG    MCHC 33.8 31.0 - 36.0 g/dL    RDW 13.9 11.5 - 14.5 %    PLATELET 062 357 - 156 K/uL    MPV 8.4 6.5 - 11.5 FL    NRBC 0.2  WBC    ABSOLUTE NRBC 0.02 K/uL    NEUTROPHILS 74 42 - 75 %    LYMPHOCYTES 12 (L) 20.5 - 51.1 %    MONOCYTES 13 (H) 1.7 - 9.3 %    EOSINOPHILS 0 (L) 0.9 - 2.9 %    BASOPHILS 1 0.0 - 2.5 %    ABS. NEUTROPHILS 7.1 1.8 - 7.7 K/UL    ABS. LYMPHOCYTES 1.2 1.0 - 4.8 K/UL    ABS. MONOCYTES 1.3 0.2 - 2.4 K/UL    ABS. EOSINOPHILS 0.0 0.0 - 0.7 K/UL    ABS. BASOPHILS 0.1 0.0 - 0.2 K/UL        Imaging:    No results found. Assessment and Plan:     Sepsis  -Patient presents with fever, tachycardia and leukocytosis meeting sepsis criteria  -Sepsis due to UTI  -Follow cultures and monitor hemodynamics     Urinary tract infection  -Urinary tract infection associated with indwelling Ferrell catheter  -Continue cefepime and follow urine culture     Hematuria  -Microscopic hematuria due to UTI     Hypokalemia  -Continue replace and monitor electrolytes     Paraplegia  -Patient is a resident of a long-term care facility  -Patient states that he has been able to ambulate  -Patient with neurogenic bladder requiring chronic indwelling Ferrell catheter     Sacral decubitus ulcer  -Wound care    Discharge disposition: Likely in the next 24 to 48 hours pending progress.     Signed By: Arun Vann MD     April 23, 2021

## 2021-04-24 LAB
ANION GAP SERPL CALC-SCNC: 11 MMOL/L (ref 5–15)
BACTERIA SPEC CULT: NORMAL
BASOPHILS # BLD: 0.1 K/UL (ref 0–0.2)
BASOPHILS NFR BLD: 1 % (ref 0–2.5)
BUN SERPL-MCNC: 10 MG/DL (ref 6–20)
BUN/CREAT SERPL: 16 (ref 12–20)
CA-I BLD-MCNC: 8 MG/DL (ref 8.5–10.1)
CHLORIDE SERPL-SCNC: 103 MMOL/L (ref 97–108)
CO2 SERPL-SCNC: 22 MMOL/L (ref 21–32)
COLONY COUNT,CNT: NORMAL
CREAT SERPL-MCNC: 0.64 MG/DL (ref 0.7–1.3)
EOSINOPHIL # BLD: 0.1 K/UL (ref 0–0.7)
EOSINOPHIL NFR BLD: 2 % (ref 0.9–2.9)
ERYTHROCYTE [DISTWIDTH] IN BLOOD BY AUTOMATED COUNT: 13.9 % (ref 11.5–14.5)
GLUCOSE SERPL-MCNC: 84 MG/DL (ref 65–100)
HCT VFR BLD AUTO: 34.8 % (ref 41–53)
HGB BLD-MCNC: 11.8 G/DL (ref 13.5–17.5)
LYMPHOCYTES # BLD: 1.5 K/UL (ref 1–4.8)
LYMPHOCYTES NFR BLD: 21 % (ref 20.5–51.1)
MAGNESIUM SERPL-MCNC: 1.7 MG/DL (ref 1.6–2.4)
MCH RBC QN AUTO: 29.1 PG (ref 31–34)
MCHC RBC AUTO-ENTMCNC: 33.9 G/DL (ref 31–36)
MCV RBC AUTO: 86.1 FL (ref 80–100)
MONOCYTES # BLD: 1.1 K/UL (ref 0.2–2.4)
MONOCYTES NFR BLD: 15 % (ref 1.7–9.3)
NEUTS SEG # BLD: 4.6 K/UL (ref 1.8–7.7)
NEUTS SEG NFR BLD: 61 % (ref 42–75)
NRBC # BLD: 0.01 K/UL
NRBC BLD-RTO: 0.2 PER 100 WBC
PLATELET # BLD AUTO: 261 K/UL (ref 150–400)
PMV BLD AUTO: 8.3 FL (ref 6.5–11.5)
POTASSIUM SERPL-SCNC: 3.6 MMOL/L (ref 3.5–5.1)
RBC # BLD AUTO: 4.05 M/UL (ref 4.5–5.9)
SODIUM SERPL-SCNC: 136 MMOL/L (ref 136–145)
SPECIAL REQUESTS,SREQ: NORMAL
WBC # BLD AUTO: 7.4 K/UL (ref 4.4–11.3)

## 2021-04-24 PROCEDURE — 94762 N-INVAS EAR/PLS OXIMTRY CONT: CPT

## 2021-04-24 PROCEDURE — 99218 HC RM OBSERVATION: CPT

## 2021-04-24 PROCEDURE — 96376 TX/PRO/DX INJ SAME DRUG ADON: CPT

## 2021-04-24 PROCEDURE — 80048 BASIC METABOLIC PNL TOTAL CA: CPT

## 2021-04-24 PROCEDURE — 36415 COLL VENOUS BLD VENIPUNCTURE: CPT

## 2021-04-24 PROCEDURE — 74011000258 HC RX REV CODE- 258: Performed by: INTERNAL MEDICINE

## 2021-04-24 PROCEDURE — 74011250636 HC RX REV CODE- 250/636: Performed by: INTERNAL MEDICINE

## 2021-04-24 PROCEDURE — 65270000029 HC RM PRIVATE

## 2021-04-24 PROCEDURE — 74011250637 HC RX REV CODE- 250/637: Performed by: FAMILY MEDICINE

## 2021-04-24 PROCEDURE — 87086 URINE CULTURE/COLONY COUNT: CPT

## 2021-04-24 PROCEDURE — 74011250637 HC RX REV CODE- 250/637: Performed by: HOSPITALIST

## 2021-04-24 PROCEDURE — 83735 ASSAY OF MAGNESIUM: CPT

## 2021-04-24 PROCEDURE — 85025 COMPLETE CBC W/AUTO DIFF WBC: CPT

## 2021-04-24 PROCEDURE — 74011250637 HC RX REV CODE- 250/637: Performed by: INTERNAL MEDICINE

## 2021-04-24 RX ORDER — LANOLIN ALCOHOL/MO/W.PET/CERES
400 CREAM (GRAM) TOPICAL 2 TIMES DAILY
Status: DISCONTINUED | OUTPATIENT
Start: 2021-04-24 | End: 2021-04-25 | Stop reason: HOSPADM

## 2021-04-24 RX ORDER — ZINC SULFATE 50(220)MG
1 CAPSULE ORAL DAILY
Status: DISCONTINUED | OUTPATIENT
Start: 2021-04-25 | End: 2021-04-25 | Stop reason: HOSPADM

## 2021-04-24 RX ADMIN — OXYCODONE 5 MG: 5 TABLET ORAL at 21:20

## 2021-04-24 RX ADMIN — CEFEPIME 2 G: 2 INJECTION, POWDER, FOR SOLUTION INTRAVENOUS at 20:17

## 2021-04-24 RX ADMIN — Medication 10 ML: at 21:17

## 2021-04-24 RX ADMIN — MAGNESIUM OXIDE 400 MG: 400 TABLET ORAL at 09:12

## 2021-04-24 RX ADMIN — CLONAZEPAM 0.25 MG: 0.5 TABLET ORAL at 21:15

## 2021-04-24 RX ADMIN — GABAPENTIN 300 MG: 300 CAPSULE ORAL at 21:15

## 2021-04-24 RX ADMIN — CYCLOBENZAPRINE 5 MG: 10 TABLET, FILM COATED ORAL at 16:56

## 2021-04-24 RX ADMIN — ASPIRIN 81 MG: 81 TABLET, CHEWABLE ORAL at 08:50

## 2021-04-24 RX ADMIN — Medication 10 ML: at 05:43

## 2021-04-24 RX ADMIN — Medication 10 ML: at 14:08

## 2021-04-24 RX ADMIN — SILVER SULFADIAZINE: 10 CREAM TOPICAL at 08:54

## 2021-04-24 RX ADMIN — MAGNESIUM OXIDE 400 MG: 400 TABLET ORAL at 21:15

## 2021-04-24 RX ADMIN — CEFEPIME 2 G: 2 INJECTION, POWDER, FOR SOLUTION INTRAVENOUS at 11:04

## 2021-04-24 RX ADMIN — FAMOTIDINE 20 MG: 20 TABLET, FILM COATED ORAL at 08:51

## 2021-04-24 RX ADMIN — Medication 500 UNITS: at 08:48

## 2021-04-24 RX ADMIN — FAMOTIDINE 20 MG: 20 TABLET, FILM COATED ORAL at 21:15

## 2021-04-24 RX ADMIN — SODIUM CHLORIDE 125 ML/HR: 9 INJECTION, SOLUTION INTRAVENOUS at 05:36

## 2021-04-24 RX ADMIN — ZINC SULFATE 220 MG (50 MG) CAPSULE 50 MG: CAPSULE at 08:49

## 2021-04-24 RX ADMIN — TAMSULOSIN HYDROCHLORIDE 0.4 MG: 0.4 CAPSULE ORAL at 08:51

## 2021-04-24 RX ADMIN — ACETAMINOPHEN 650 MG: 325 TABLET ORAL at 00:21

## 2021-04-24 RX ADMIN — OXYCODONE 5 MG: 5 TABLET ORAL at 16:59

## 2021-04-24 RX ADMIN — CYCLOBENZAPRINE 5 MG: 10 TABLET, FILM COATED ORAL at 08:48

## 2021-04-24 RX ADMIN — THIAMINE HCL TAB 100 MG 200 MG: 100 TAB at 08:51

## 2021-04-24 RX ADMIN — GABAPENTIN 300 MG: 300 CAPSULE ORAL at 08:48

## 2021-04-24 RX ADMIN — CLONAZEPAM 0.25 MG: 0.5 TABLET ORAL at 08:49

## 2021-04-24 RX ADMIN — CEFEPIME 2 G: 2 INJECTION, POWDER, FOR SOLUTION INTRAVENOUS at 03:49

## 2021-04-24 RX ADMIN — SODIUM CHLORIDE 125 ML/HR: 9 INJECTION, SOLUTION INTRAVENOUS at 16:19

## 2021-04-24 RX ADMIN — GABAPENTIN 300 MG: 300 CAPSULE ORAL at 16:18

## 2021-04-24 NOTE — PROGRESS NOTES
Progress Note    Patient: Allison Heller MRN: [id number]  SSN: [id number]    YOB: 1965  Age: 48 y.o. Sex: male      Admit Date: 4/21/2021    LOS: 2 days     Subjective:     Patient presents from [address] [place] [address] home with fever, sepsis due to UTI. Patient is still febrile. Denies any abdominal pain, nausea vomiting. Objective:     Vitals:    04/24/21 0503 04/24/21 0745 04/24/21 1200 04/24/21 1219   BP: 113/81 120/83  129/84   Pulse: 76 80 82 82   Resp: 20 18  18   Temp: 97.6 °F (36.4 °C) 97.9 °F (36.6 °C)  98.1 °F (36.7 °C)   SpO2:    95%   Weight:       Height:            Intake and Output:  Current Shift: No intake/output data recorded. Last three shifts: 04/22 1901 - 04/24 0700  In: -   Out: 4350 [Urine:4350]    Physical Exam:   GENERAL: alert, cooperative, no distress, appears stated age  THROAT & NECK: normal and no erythema or exudates noted. LUNG: clear to auscultation bilaterally  HEART: regular rate and rhythm, S1, S2 normal, no murmur, click, rub or gallop  ABDOMEN: soft, non-tender. Bowel sounds normal. No masses,  no organomegaly  EXTREMITIES:  extremities normal, atraumatic, no cyanosis or edema  SKIN: no rash or abnormalities  NEUROLOGIC: AOx3. PSYCHIATRIC: non focal    Lab/Data Review: All lab results for the last 24 hours reviewed.      Recent Results (from the past 24 hour(s))   METABOLIC PANEL, BASIC    Collection Time: 04/24/21  5:43 AM   Result Value Ref Range    Sodium 136 136 - 145 mmol/L    Potassium 3.6 3.5 - 5.1 mmol/L    Chloride 103 97 - 108 mmol/L    CO2 22 21 - 32 mmol/L    Anion gap 11 5 - 15 mmol/L    Glucose 84 65 - 100 mg/dL    BUN 10 6 - 20 mg/dL    Creatinine 0.64 (L) 0.70 - 1.30 mg/dL    BUN/Creatinine ratio 16 12 - 20      GFR est AA >60 >60 ml/min/1.73m2    GFR est non-AA >60 >60 ml/min/1.73m2    Calcium 8.0 (L) 8.5 - 10.1 mg/dL   CBC WITH AUTOMATED DIFF    Collection Time: 04/24/21  5:43 AM   Result Value Ref Range    WBC 7.4 4.4 - 11.3 K/uL    RBC 4.05 (L) 4.50 - 5.90 M/uL    HGB 11.8 (L) 13.5 - 17.5 g/dL    HCT 34.8 (L) 41 - 53 %    MCV 86.1 80 - 100 FL    MCH 29.1 (L) 31 - 34 PG    MCHC 33.9 31.0 - 36.0 g/dL    RDW 13.9 11.5 - 14.5 %    PLATELET 666 153 - 810 K/uL    MPV 8.3 6.5 - 11.5 FL    NRBC 0.2  WBC    ABSOLUTE NRBC 0.01 K/uL    NEUTROPHILS 61 42 - 75 %    LYMPHOCYTES 21 20.5 - 51.1 %    MONOCYTES 15 (H) 1.7 - 9.3 %    EOSINOPHILS 2 0.9 - 2.9 %    BASOPHILS 1 0.0 - 2.5 %    ABS. NEUTROPHILS 4.6 1.8 - 7.7 K/UL    ABS. LYMPHOCYTES 1.5 1.0 - 4.8 K/UL    ABS. MONOCYTES 1.1 0.2 - 2.4 K/UL    ABS. EOSINOPHILS 0.1 0.0 - 0.7 K/UL    ABS. BASOPHILS 0.1 0.0 - 0.2 K/UL   MAGNESIUM    Collection Time: 04/24/21  5:43 AM   Result Value Ref Range    Magnesium 1.7 1.6 - 2.4 mg/dL        Imaging:    No results found.        Assessment and Plan:     Sepsis  -Patient presents with fever, tachycardia and leukocytosis meeting sepsis criteria  -Sepsis due to UTI  -Blood cultures x2 continue showed no growth  -Normalized white blood count  -Culture shows greater than 2 organisms possible contamination and requested recollection so we will send repeat urine today on 4/24     Urinary tract infection  -Urinary tract infection associated with indwelling Ferrell catheter  -Continue cefepime and follow urine culture     Hematuria  -Microscopic hematuria due to UTI     Hypokalemia  -Continue replace and monitor electrolytes    Hypomagnesemia:  -Initially 1.5 and repeat on 4/24 is at 1.7 continue oral magnesium oxide twice daily     Paraplegia  -Patient is a resident of a long-term care facility but states that he pays to get therapy 5 days a week  -Patient states that he has been able to ambulate  -Patient with neurogenic bladder requiring chronic indwelling Ferrell catheter     Sacral decubitus ulcer  -Wound care    GI prophylaxis  -Pepcid twice daily    DVT prophylaxis  -Held anticoagulation for DVT prophylaxis secondary to large amount of blood in the UA and will use SCDs    Spent 30 minutes evaluting and coordinating patient care of which >50% was spent coordinating and counseling. Discharge disposition: Likely in the next 24 to 48 hours pending progress.     Signed By: Benita Alvarenga MD     April 24, 2021

## 2021-04-24 NOTE — ROUTINE PROCESS
The pt is resting quietly in bed with the bedside pulse oximetry in place. He is maintaining his O2 sats in the 90's. The bhardwaj is patent & draining jose g urine. He is repositioning himself off his back well. The drsg is intact to his sacral area. Iv cont to infuse at 125cc/hr.

## 2021-04-24 NOTE — ROUTINE PROCESS
Report received from David Orona (offgoing nurse). Report was given to JOSE MANUEL Martinez (oncoming nurse). Report consist of SBAR. The pt is resting quietly in bed.

## 2021-04-24 NOTE — PROGRESS NOTES
Problem: Falls - Risk of  Goal: *Absence of Falls  Description: Document Onalee Gum Fall Risk and appropriate interventions in the flowsheet. Outcome: Progressing Towards Goal  Note: Fall Risk Interventions:  Mobility Interventions: Patient to call before getting OOB, Strengthening exercises (ROM-active/passive), Utilize walker, cane, or other assistive device         Medication Interventions: Patient to call before getting OOB    Elimination Interventions: Call light in reach, Toileting schedule/hourly rounds    History of Falls Interventions:  Investigate reason for fall, Door open when patient unattended

## 2021-04-24 NOTE — ROUTINE PROCESS
Bedside shift change report given to lisa pham (oncoming nurse) by whitley(offgoing nurse). Report included the following information Kardex.

## 2021-04-25 VITALS
RESPIRATION RATE: 18 BRPM | SYSTOLIC BLOOD PRESSURE: 121 MMHG | DIASTOLIC BLOOD PRESSURE: 90 MMHG | OXYGEN SATURATION: 97 % | HEART RATE: 71 BPM | WEIGHT: 138.7 LBS | BODY MASS INDEX: 18.79 KG/M2 | TEMPERATURE: 98.1 F | HEIGHT: 72 IN

## 2021-04-25 LAB
BACTERIA SPEC CULT: NORMAL
SPECIAL REQUESTS,SREQ: NORMAL

## 2021-04-25 PROCEDURE — 74011250637 HC RX REV CODE- 250/637: Performed by: HOSPITALIST

## 2021-04-25 PROCEDURE — 94762 N-INVAS EAR/PLS OXIMTRY CONT: CPT

## 2021-04-25 PROCEDURE — 74011250636 HC RX REV CODE- 250/636: Performed by: INTERNAL MEDICINE

## 2021-04-25 PROCEDURE — 99218 HC RM OBSERVATION: CPT

## 2021-04-25 PROCEDURE — 96376 TX/PRO/DX INJ SAME DRUG ADON: CPT

## 2021-04-25 PROCEDURE — 74011250637 HC RX REV CODE- 250/637: Performed by: FAMILY MEDICINE

## 2021-04-25 PROCEDURE — 74011000258 HC RX REV CODE- 258: Performed by: INTERNAL MEDICINE

## 2021-04-25 PROCEDURE — 74011250637 HC RX REV CODE- 250/637: Performed by: INTERNAL MEDICINE

## 2021-04-25 RX ORDER — ASPIRIN 325 MG/1
200 TABLET, FILM COATED ORAL DAILY
Qty: 30 TAB | Refills: 0 | Status: SHIPPED | OUTPATIENT
Start: 2021-04-26

## 2021-04-25 RX ORDER — LANOLIN ALCOHOL/MO/W.PET/CERES
400 CREAM (GRAM) TOPICAL DAILY
Qty: 30 TAB | Refills: 0 | Status: SHIPPED | OUTPATIENT
Start: 2021-04-25 | End: 2021-05-25

## 2021-04-25 RX ORDER — CEPHALEXIN 500 MG/1
500 CAPSULE ORAL 4 TIMES DAILY
Qty: 28 CAP | Refills: 0 | Status: SHIPPED | OUTPATIENT
Start: 2021-04-25 | End: 2021-05-02

## 2021-04-25 RX ADMIN — GABAPENTIN 300 MG: 300 CAPSULE ORAL at 08:34

## 2021-04-25 RX ADMIN — Medication 500 UNITS: at 08:34

## 2021-04-25 RX ADMIN — OXYCODONE 5 MG: 5 TABLET ORAL at 04:45

## 2021-04-25 RX ADMIN — CEFEPIME 2 G: 2 INJECTION, POWDER, FOR SOLUTION INTRAVENOUS at 11:32

## 2021-04-25 RX ADMIN — ZINC SULFATE 220 MG (50 MG) CAPSULE 1 CAPSULE: CAPSULE at 08:33

## 2021-04-25 RX ADMIN — THIAMINE HCL TAB 100 MG 200 MG: 100 TAB at 08:33

## 2021-04-25 RX ADMIN — CEFEPIME 2 G: 2 INJECTION, POWDER, FOR SOLUTION INTRAVENOUS at 03:41

## 2021-04-25 RX ADMIN — ASPIRIN 81 MG: 81 TABLET, CHEWABLE ORAL at 08:34

## 2021-04-25 RX ADMIN — MAGNESIUM OXIDE 400 MG: 400 TABLET ORAL at 08:34

## 2021-04-25 RX ADMIN — CYCLOBENZAPRINE 5 MG: 10 TABLET, FILM COATED ORAL at 14:55

## 2021-04-25 RX ADMIN — CYCLOBENZAPRINE 5 MG: 10 TABLET, FILM COATED ORAL at 04:46

## 2021-04-25 RX ADMIN — OXYCODONE 5 MG: 5 TABLET ORAL at 14:55

## 2021-04-25 RX ADMIN — Medication 10 ML: at 05:14

## 2021-04-25 RX ADMIN — TAMSULOSIN HYDROCHLORIDE 0.4 MG: 0.4 CAPSULE ORAL at 08:34

## 2021-04-25 RX ADMIN — SILVER SULFADIAZINE: 10 CREAM TOPICAL at 08:35

## 2021-04-25 RX ADMIN — CLONAZEPAM 0.25 MG: 0.5 TABLET ORAL at 08:34

## 2021-04-25 RX ADMIN — FAMOTIDINE 20 MG: 20 TABLET, FILM COATED ORAL at 08:34

## 2021-04-25 NOTE — PROGRESS NOTES
Problem: Falls - Risk of  Goal: *Absence of Falls  Description: Document [place] Negrete Fall Risk and appropriate interventions in the flowsheet. Outcome: Progressing Towards Goal  Note: Fall Risk Interventions:  Mobility Interventions: Patient to call before getting OOB         Medication Interventions: Patient to call before getting OOB    Elimination Interventions: Call light in reach    History of Falls Interventions: Evaluate medications/consider consulting pharmacy         Problem: Patient Education: Go to Patient Education Activity  Goal: Patient/Family Education  Outcome: Progressing Towards Goal     Problem: Pressure Injury - Risk of  Goal: *Prevention of pressure injury  Description: Document Brayden Scale and appropriate interventions in the flowsheet.   Outcome: Progressing Towards Goal  Note: Pressure Injury Interventions:  Sensory Interventions: Keep linens dry and wrinkle-free    Moisture Interventions: Absorbent underpads    Activity Interventions: PT/OT evaluation    Mobility Interventions: PT/OT evaluation    Nutrition Interventions: Offer support with meals,snacks and hydration, Document food/fluid/supplement intake    Friction and Shear Interventions: Minimize layers                Problem: Patient Education: Go to Patient Education Activity  Goal: Patient/Family Education  Outcome: Progressing Towards Goal

## 2021-04-25 NOTE — ROUTINE PROCESS
Report given to Flo Yeung at [facility] . Patient left via transport, all belongings with patient.

## 2021-04-25 NOTE — DISCHARGE SUMMARY
Physician Discharge Summary       Patient: Gricelda Boogie MRN: [id number]     YOB: 1965  Age: 48 y.o. Sex: male    PCP: Silvestre Luna MD    Allergies: Patient has no known allergies. Admit date: 4/21/2021  Admitting Provider: Moses Liriano MD    Discharge date: 4/25/2021  Discharging Provider: Patricia Lai MD    * Admission Diagnoses:   UTI (urinary tract infection) [N39.0]  Sepsis ([facility] [place] [address] [A41.9]      * Discharge Diagnoses:    Hospital Problems as of 4/25/2021 Date Reviewed: 9/12/2020          Codes Class Noted - Resolved POA    * (Principal) Sepsis ([facility] [place] [address] ICD-10-CM: A41.9  ICD-9-CM: 038.9, 995.91  9/12/2020 - Present Unknown        UTI (urinary tract infection) ICD-10-CM: N39.0  ICD-9-CM: 599.0  9/12/2020 - Present Unknown                * Hospital Course:   HPI:  Gricelda Boogie is a 48 y.o.  male with past medical and surgical hx outlined below who presents with fever and chills from nursing home and clincial findings of sepsis secondary to complicated UTI. He has chronic indwelling bhardwaj catheter for urinary retention and urine is cloudy and most likely source. He is noted to have an unstagable sacral wound. Tmax 103.3 with  fluid responsive. He is AAOx3 in my face to face introductory visit in the ER. We were asked to admit for work up and evaluation of the above problems.      Sepsis  -Patient presents with fever, tachycardia and leukocytosis meeting sepsis criteria  -Sepsis due to UTI  -Blood cultures x2 continue showed no growth  -Normalized white blood count  -Culture shows greater than 2 organisms possible contamination and requested recollection so we will send repeat urine today on 4/24 and repeat urine culture is still pending but patient has been afebrile over the last 24 hours and responded well to current antibiotics so we will continue on cephalosporin therapy for additional 7 days with Keflex 500 mg oral 4 times daily x7 days     Urinary tract infection  -Urinary tract infection associated with indwelling Ferrell catheter  -Continue cefepime and follow urine culture and inpatient stay initial urine culture showed greater than 2 organisms and positive for contamination previous most recent urine culture showing procidentia and similar sensitivity to cephalosporins and since patient has been afebrile over the past 24 hours while on current cephalosporin therapy patient can be discharged back to long-term care and follow-up repeat urine culture obtained on 4/24 for results.   The patient will be continued on Keflex 500 mg oral 4 times daily for additional 7 days and adjustments to be made depending on urine culture results     Hematuria  -Microscopic hematuria due to UTI     Hypokalemia  -Continue replace and monitor electrolytes and stable on levels on discharge     Hypomagnesemia:  -Initially 1.5 and repeat on 4/24 is at 1.7 continue oral magnesium oxide twice daily and on discharge will continue 400mg oral daily      Paraplegia  -Patient is a resident of a long-term care facility but states that he pays to get therapy 5 days a week  -Patient states that he has been able to ambulate  -Patient with neurogenic bladder requiring chronic indwelling Ferrell catheter and states he has follow up with urology on outpatient      Sacral decubitus ulcer  -Wound care       * Procedures:   * No surgery found *        Consults:   NONE    Vitals Last 24 Hours:  Patient Vitals for the past 24 hrs:   Temp Pulse Resp BP SpO2   04/25/21 1152 98.1 °F (36.7 °C) 71 18 (!) 121/90 97 %   04/25/21 0850     96 %   04/25/21 0847 98 °F (36.7 °C) 77 18 133/88    04/25/21 0750 98 °F (36.7 °C) 77 18 133/88 94 %   04/25/21 0533 98.4 °F (36.9 °C) 81 18 123/82 90 %   04/24/21 2350 98.8 °F (37.1 °C) 80 18 (!) 132/92 93 %   04/24/21 1942 98.3 °F (36.8 °C) 80 18 122/84 97 %   04/24/21 1632 97.9 °F (36.6 °C) 71 18 133/87 95 %   04/24/21 1219 98.1 °F (36.7 °C) 82 18 129/84 95 %   04/24/21 1200  80           Discharge Exam:  General: Alert, cooperative, no distress, appears stated age. Head:  Normocephalic, without obvious abnormality, atraumatic. Eyes:  Conjunctivae/corneas clear. Pupils equal, round, reactive to light. Extraocular movements intact. Lungs:  Clear to auscultation bilaterally. no wheeze, rales, crackles, rhonchi   Chest wall: No tenderness or deformity. Heart:  Regular rate and rhythm, S1, S2 normal, no murmur, click, rub or gallop. Abdomen:  Soft, non-tender. Bowel sounds normal. No masses,  No organomegaly. Extremities: Extremities normal, atraumatic, no cyanosis or edema. Pulses: 2+ and symmetric all extremities. Skin: Skin color, texture, turgor normal. No rashes or lesions  Neurologic: Awake, Alert, oriented. No obvious gross sensory or motor deficits    Labs:  No results found for this or any previous visit (from the past 24 hour(s)). Imaging:  Xr Chest Port    Result Date: 4/22/2021  No airspace disease in the lungs. Emphysema. Follow-up as clinically indicated. * Discharge Condition: improved  * Disposition: SNF/LTC      Discharge Medications:  Current Discharge Medication List      START taking these medications    Details   magnesium oxide (MAG-OX) 400 mg tablet Take 1 Tab by mouth daily for 30 days. Qty: 30 Tab, Refills: 0      thiamine mononitrate (B-1) 100 mg tablet Take 2 Tabs by mouth daily. Qty: 30 Tab, Refills: 0      cephALEXin (Keflex) 500 mg capsule Take 1 Cap by mouth four (4) times daily for 7 days. Qty: 28 Cap, Refills: 0         CONTINUE these medications which have NOT CHANGED    Details   silver sulfADIAZINE (SILVADENE) 1 % topical cream Apply  to affected area daily. Qty: 50 g, Refills: 0      docusate sodium (COLACE) 100 mg capsule Take 100 mg by mouth two (2) times daily as needed for Constipation. mirtazapine (REMERON) 7.5 mg tablet Take 7.5 mg by mouth nightly.       oxyCODONE IR (ROXICODONE) 5 mg immediate release tablet Take 5 mg by mouth every four (4) hours as needed for Pain. zinc sulfate (ZINCATE) 220 (50) mg capsule Take 220 mg by mouth daily. acetaminophen (TYLENOL) 325 mg tablet Take 650 mg by mouth every four (4) hours as needed. aspirin 81 mg chewable tablet Take 81 mg by mouth daily. cholecalciferol (VITAMIN D3) (1000 Units /25 mcg) tablet Take 500 Units by mouth daily. clonazePAM (KlonoPIN) 0.5 mg tablet Take 0.25 mg by mouth two (2) times a day. cyclobenzaprine (FLEXERIL) 5 mg tablet Take 5 mg by mouth every eight (8) hours as needed. famotidine (PEPCID) 20 mg tablet Take 20 mg by mouth two (2) times a day.      gabapentin (NEURONTIN) 300 mg capsule Take 300 mg by mouth three (3) times daily. albuterol-ipratropium (DUO-NEB) 2.5 mg-0.5 mg/3 ml nebu Take 3 mL by inhalation every six (6) hours as needed. tamsulosin (FLOMAX) 0.4 mg capsule Take 0.4 mg by mouth. STOP taking these medications       cefdinir (OMNICEF) 300 mg capsule Comments:   Reason for Stopping:         enoxaparin (LOVENOX) 40 mg/0.4 mL Comments:   Reason for Stopping:         thiamine HCL (B-1) 100 mg tablet Comments:   Reason for Stopping:         multivit-min-iron-folic-vit K1 8 ZS-[id number] mcg- 10 mcg chew Comments:   Reason for Stopping:                 * Follow-up Care/Patient Instructions: Activity: Bedrest  Diet: Regular Diet      Follow-up Information     Follow up With Specialties Details Why Contact Info    Lee Ortega MD [address]      Spent 35 minutes evaluting and coordinating patient care and discharging to LTC at [address] [place] [address]  of which >50% was spent coordinating and counseling.      Signed:  Pepe Duncan MD  4/25/2021  11:54 AM

## 2021-04-25 NOTE — PROGRESS NOTES
Problem: Falls - Risk of  Goal: *Absence of Falls  Description: Document Natasha Noble Fall Risk and appropriate interventions in the flowsheet.   Outcome: Progressing Towards Goal  Note: Fall Risk Interventions:  Mobility Interventions: Patient to call before getting OOB         Medication Interventions: Patient to call before getting OOB    Elimination Interventions: Call light in reach    History of Falls Interventions: Evaluate medications/consider consulting pharmacy

## 2021-04-25 NOTE — ROUTINE PROCESS
Patient is offered pain medication due to small increase in pain. Patient reports that he would like to wait later during day shift.

## 2021-04-25 NOTE — ROUTINE PROCESS
Patient is requesting pain medication for chronic back pain. PRN oxycodone is prepared for administration.

## 2021-04-25 NOTE — ROUTINE PROCESS
Patient is resting in bed in no apparent distress. Airway is patent and respirations are even and unlabored on room air. Patient appears to be sleeping. Normal rise and fall of chest. Will continue to monitor patient.

## 2021-10-06 NOTE — ASSESSMENT & PLAN NOTE
-Nicotine patch has been offered  -Patient has refused Gen: No fever  Resp: No cough or trouble breathing  Cardiovascular: No chest pain  Gastroenteric: No nausea/vomiting  :  last urination in AM  MS: +back pain  Skin: No rashes  Neuro: +b/l lower extremity weakness and numbness  Remainder negative, except as per the HPI

## 2022-03-18 PROBLEM — G06.1 SPINAL CORD ABSCESS: Status: ACTIVE | Noted: 2020-04-08

## 2022-03-19 PROBLEM — T83.511A UTI (URINARY TRACT INFECTION) DUE TO URINARY INDWELLING FOLEY CATHETER (HCC): Status: ACTIVE | Noted: 2021-01-14

## 2022-03-19 PROBLEM — F10.20 ALCOHOLISM (HCC): Status: ACTIVE | Noted: 2020-03-17

## 2022-03-19 PROBLEM — N39.0 UTI (URINARY TRACT INFECTION): Status: ACTIVE | Noted: 2020-09-12

## 2022-03-19 PROBLEM — A41.9 SEPSIS (HCC): Status: ACTIVE | Noted: 2020-09-12

## 2022-03-19 PROBLEM — N39.0 UTI (URINARY TRACT INFECTION) DUE TO URINARY INDWELLING FOLEY CATHETER (HCC): Status: ACTIVE | Noted: 2021-01-14

## 2022-03-19 PROBLEM — F41.9 ANXIETY: Status: ACTIVE | Noted: 2020-07-20

## 2022-03-20 PROBLEM — Z72.0 TOBACCO ABUSE: Status: ACTIVE | Noted: 2020-03-17

## 2023-05-24 RX ORDER — MIRTAZAPINE 7.5 MG/1
7.5 TABLET, FILM COATED ORAL NIGHTLY
COMMUNITY

## 2023-05-24 RX ORDER — FAMOTIDINE 20 MG/1
20 TABLET, FILM COATED ORAL 2 TIMES DAILY
COMMUNITY
Start: 2020-07-28

## 2023-05-24 RX ORDER — ACETAMINOPHEN 325 MG/1
650 TABLET ORAL EVERY 4 HOURS PRN
COMMUNITY
Start: 2020-07-23

## 2023-05-24 RX ORDER — CLONAZEPAM 0.5 MG/1
0.25 TABLET ORAL 2 TIMES DAILY
COMMUNITY
Start: 2020-07-23

## 2023-05-24 RX ORDER — GABAPENTIN 300 MG/1
300 CAPSULE ORAL 3 TIMES DAILY
COMMUNITY
Start: 2020-07-23

## 2023-05-24 RX ORDER — TAMSULOSIN HYDROCHLORIDE 0.4 MG/1
0.4 CAPSULE ORAL
COMMUNITY
Start: 2020-07-23

## 2023-05-24 RX ORDER — ZINC SULFATE 50(220)MG
220 CAPSULE ORAL DAILY
COMMUNITY

## 2023-05-24 RX ORDER — LANOLIN ALCOHOL/MO/W.PET/CERES
200 CREAM (GRAM) TOPICAL DAILY
COMMUNITY
Start: 2021-04-26

## 2023-05-24 RX ORDER — ASPIRIN 81 MG/1
81 TABLET, CHEWABLE ORAL DAILY
COMMUNITY
Start: 2020-07-28

## 2023-05-24 RX ORDER — OXYCODONE HYDROCHLORIDE 5 MG/1
5 TABLET ORAL EVERY 4 HOURS PRN
COMMUNITY

## 2023-05-24 RX ORDER — IPRATROPIUM BROMIDE AND ALBUTEROL SULFATE 2.5; .5 MG/3ML; MG/3ML
3 SOLUTION RESPIRATORY (INHALATION) EVERY 6 HOURS PRN
COMMUNITY
Start: 2020-07-23

## 2023-05-24 RX ORDER — PSEUDOEPHEDRINE HCL 30 MG
100 TABLET ORAL 2 TIMES DAILY PRN
COMMUNITY

## 2023-05-24 RX ORDER — CYCLOBENZAPRINE HCL 5 MG
5 TABLET ORAL EVERY 8 HOURS PRN
COMMUNITY
Start: 2020-07-23

## 2025-05-21 NOTE — ASSESSMENT & PLAN NOTE
-Suspicion about whether related to previous history of IV drug abuse  -Patient remains afebrile however leukocytosis suspect associated with UTI/urosepsis  -Abdomen pelvic CT obtained to evaluate possible spinal cord acute changes Yes...